# Patient Record
Sex: FEMALE | Race: WHITE | HISPANIC OR LATINO | Employment: FULL TIME | URBAN - METROPOLITAN AREA
[De-identification: names, ages, dates, MRNs, and addresses within clinical notes are randomized per-mention and may not be internally consistent; named-entity substitution may affect disease eponyms.]

---

## 2024-09-03 ENCOUNTER — HOSPITAL ENCOUNTER (OUTPATIENT)
Facility: HOSPITAL | Age: 34
Setting detail: OBSERVATION
Discharge: HOME | End: 2024-09-05
Attending: EMERGENCY MEDICINE | Admitting: STUDENT IN AN ORGANIZED HEALTH CARE EDUCATION/TRAINING PROGRAM
Payer: COMMERCIAL

## 2024-09-03 DIAGNOSIS — R42 VERTIGO: Primary | ICD-10-CM

## 2024-09-03 LAB
ANION GAP SERPL CALC-SCNC: 12 MMOL/L
APPEARANCE UR: CLEAR
BASOPHILS # BLD AUTO: 0.07 X10*3/UL (ref 0–0.1)
BASOPHILS NFR BLD AUTO: 0.3 %
BILIRUB UR STRIP.AUTO-MCNC: NEGATIVE MG/DL
BUN SERPL-MCNC: 13 MG/DL (ref 8–25)
CALCIUM SERPL-MCNC: 9.4 MG/DL (ref 8.5–10.4)
CHLORIDE SERPL-SCNC: 100 MMOL/L (ref 97–107)
CO2 SERPL-SCNC: 26 MMOL/L (ref 24–31)
COLOR UR: YELLOW
CREAT SERPL-MCNC: 0.9 MG/DL (ref 0.4–1.6)
EGFRCR SERPLBLD CKD-EPI 2021: 87 ML/MIN/1.73M*2
EOSINOPHIL # BLD AUTO: 0.12 X10*3/UL (ref 0–0.7)
EOSINOPHIL NFR BLD AUTO: 0.6 %
ERYTHROCYTE [DISTWIDTH] IN BLOOD BY AUTOMATED COUNT: 12.1 % (ref 11.5–14.5)
GLUCOSE SERPL-MCNC: 134 MG/DL (ref 65–99)
GLUCOSE UR STRIP.AUTO-MCNC: NORMAL MG/DL
HCG UR QL IA.RAPID: NEGATIVE
HCT VFR BLD AUTO: 38.6 % (ref 36–46)
HGB BLD-MCNC: 13.8 G/DL (ref 12–16)
IMM GRANULOCYTES # BLD AUTO: 0.04 X10*3/UL (ref 0–0.7)
IMM GRANULOCYTES NFR BLD AUTO: 0.2 % (ref 0–0.9)
KETONES UR STRIP.AUTO-MCNC: NEGATIVE MG/DL
LEUKOCYTE ESTERASE UR QL STRIP.AUTO: NEGATIVE
LYMPHOCYTES # BLD AUTO: 2.13 X10*3/UL (ref 1.2–4.8)
LYMPHOCYTES NFR BLD AUTO: 10.6 %
MCH RBC QN AUTO: 30.9 PG (ref 26–34)
MCHC RBC AUTO-ENTMCNC: 35.8 G/DL (ref 32–36)
MCV RBC AUTO: 86 FL (ref 80–100)
MONOCYTES # BLD AUTO: 1.04 X10*3/UL (ref 0.1–1)
MONOCYTES NFR BLD AUTO: 5.2 %
NEUTROPHILS # BLD AUTO: 16.62 X10*3/UL (ref 1.2–7.7)
NEUTROPHILS NFR BLD AUTO: 83.1 %
NITRITE UR QL STRIP.AUTO: NEGATIVE
NRBC BLD-RTO: ABNORMAL /100{WBCS}
PH UR STRIP.AUTO: 5.5 [PH]
PLATELET # BLD AUTO: 318 X10*3/UL (ref 150–450)
POTASSIUM SERPL-SCNC: 4.5 MMOL/L (ref 3.4–5.1)
PROT UR STRIP.AUTO-MCNC: NORMAL MG/DL
RBC # BLD AUTO: 4.47 X10*6/UL (ref 4–5.2)
RBC # UR STRIP.AUTO: NEGATIVE /UL
SODIUM SERPL-SCNC: 138 MMOL/L (ref 133–145)
SP GR UR STRIP.AUTO: 1.03
UROBILINOGEN UR STRIP.AUTO-MCNC: NORMAL MG/DL
WBC # BLD AUTO: 20 X10*3/UL (ref 4.4–11.3)

## 2024-09-03 PROCEDURE — 96361 HYDRATE IV INFUSION ADD-ON: CPT

## 2024-09-03 PROCEDURE — 81025 URINE PREGNANCY TEST: CPT | Performed by: EMERGENCY MEDICINE

## 2024-09-03 PROCEDURE — 80048 BASIC METABOLIC PNL TOTAL CA: CPT | Performed by: EMERGENCY MEDICINE

## 2024-09-03 PROCEDURE — 2500000004 HC RX 250 GENERAL PHARMACY W/ HCPCS (ALT 636 FOR OP/ED): Performed by: EMERGENCY MEDICINE

## 2024-09-03 PROCEDURE — 99285 EMERGENCY DEPT VISIT HI MDM: CPT | Mod: 25

## 2024-09-03 PROCEDURE — 96374 THER/PROPH/DIAG INJ IV PUSH: CPT

## 2024-09-03 PROCEDURE — 85025 COMPLETE CBC W/AUTO DIFF WBC: CPT | Performed by: EMERGENCY MEDICINE

## 2024-09-03 PROCEDURE — 87491 CHLMYD TRACH DNA AMP PROBE: CPT | Mod: TRILAB,WESLAB | Performed by: EMERGENCY MEDICINE

## 2024-09-03 PROCEDURE — 36415 COLL VENOUS BLD VENIPUNCTURE: CPT | Performed by: EMERGENCY MEDICINE

## 2024-09-03 PROCEDURE — 81001 URINALYSIS AUTO W/SCOPE: CPT | Performed by: EMERGENCY MEDICINE

## 2024-09-03 PROCEDURE — 96375 TX/PRO/DX INJ NEW DRUG ADDON: CPT

## 2024-09-03 PROCEDURE — 2500000001 HC RX 250 WO HCPCS SELF ADMINISTERED DRUGS (ALT 637 FOR MEDICARE OP): Performed by: EMERGENCY MEDICINE

## 2024-09-03 PROCEDURE — 86140 C-REACTIVE PROTEIN: CPT | Performed by: STUDENT IN AN ORGANIZED HEALTH CARE EDUCATION/TRAINING PROGRAM

## 2024-09-03 RX ORDER — DEXAMETHASONE SODIUM PHOSPHATE 10 MG/ML
10 INJECTION INTRAMUSCULAR; INTRAVENOUS ONCE
Status: COMPLETED | OUTPATIENT
Start: 2024-09-03 | End: 2024-09-03

## 2024-09-03 RX ORDER — PROCHLORPERAZINE EDISYLATE 5 MG/ML
10 INJECTION INTRAMUSCULAR; INTRAVENOUS ONCE
Status: COMPLETED | OUTPATIENT
Start: 2024-09-03 | End: 2024-09-03

## 2024-09-03 RX ORDER — MECLIZINE HYDROCHLORIDE 25 MG/1
25 TABLET ORAL ONCE
Status: COMPLETED | OUTPATIENT
Start: 2024-09-03 | End: 2024-09-03

## 2024-09-03 RX ORDER — ONDANSETRON HYDROCHLORIDE 2 MG/ML
4 INJECTION, SOLUTION INTRAVENOUS ONCE
Status: COMPLETED | OUTPATIENT
Start: 2024-09-03 | End: 2024-09-03

## 2024-09-03 ASSESSMENT — PAIN - FUNCTIONAL ASSESSMENT: PAIN_FUNCTIONAL_ASSESSMENT: 0-10

## 2024-09-03 ASSESSMENT — COLUMBIA-SUICIDE SEVERITY RATING SCALE - C-SSRS
2. HAVE YOU ACTUALLY HAD ANY THOUGHTS OF KILLING YOURSELF?: NO
6. HAVE YOU EVER DONE ANYTHING, STARTED TO DO ANYTHING, OR PREPARED TO DO ANYTHING TO END YOUR LIFE?: NO
1. IN THE PAST MONTH, HAVE YOU WISHED YOU WERE DEAD OR WISHED YOU COULD GO TO SLEEP AND NOT WAKE UP?: NO

## 2024-09-03 ASSESSMENT — PAIN SCALES - GENERAL: PAINLEVEL_OUTOF10: 0 - NO PAIN

## 2024-09-04 ENCOUNTER — APPOINTMENT (OUTPATIENT)
Dept: RADIOLOGY | Facility: HOSPITAL | Age: 34
End: 2024-09-04
Payer: COMMERCIAL

## 2024-09-04 PROBLEM — R42 VERTIGO: Status: ACTIVE | Noted: 2024-09-04

## 2024-09-04 PROBLEM — D72.829 LEUKOCYTOSIS: Status: ACTIVE | Noted: 2024-09-04

## 2024-09-04 LAB
ANION GAP SERPL CALC-SCNC: 12 MMOL/L
BUN SERPL-MCNC: 13 MG/DL (ref 8–25)
C TRACH RRNA SPEC QL NAA+PROBE: NEGATIVE
CALCIUM SERPL-MCNC: 9.2 MG/DL (ref 8.5–10.4)
CHLORIDE SERPL-SCNC: 104 MMOL/L (ref 97–107)
CO2 SERPL-SCNC: 20 MMOL/L (ref 24–31)
CREAT SERPL-MCNC: 0.7 MG/DL (ref 0.4–1.6)
CRP SERPL-MCNC: <0.3 MG/DL (ref 0–2)
EGFRCR SERPLBLD CKD-EPI 2021: >90 ML/MIN/1.73M*2
ERYTHROCYTE [DISTWIDTH] IN BLOOD BY AUTOMATED COUNT: 12.1 % (ref 11.5–14.5)
GLUCOSE SERPL-MCNC: 178 MG/DL (ref 65–99)
HCT VFR BLD AUTO: 39.3 % (ref 36–46)
HGB BLD-MCNC: 13.6 G/DL (ref 12–16)
MCH RBC QN AUTO: 30 PG (ref 26–34)
MCHC RBC AUTO-ENTMCNC: 34.6 G/DL (ref 32–36)
MCV RBC AUTO: 87 FL (ref 80–100)
MUCOUS THREADS #/AREA URNS AUTO: NORMAL /LPF
N GONORRHOEA DNA SPEC QL PROBE+SIG AMP: NEGATIVE
NRBC BLD-RTO: 0 /100 WBCS (ref 0–0)
PLATELET # BLD AUTO: 322 X10*3/UL (ref 150–450)
POTASSIUM SERPL-SCNC: 4.3 MMOL/L (ref 3.4–5.1)
RBC # BLD AUTO: 4.54 X10*6/UL (ref 4–5.2)
RBC #/AREA URNS AUTO: NORMAL /HPF
SODIUM SERPL-SCNC: 136 MMOL/L (ref 133–145)
SQUAMOUS #/AREA URNS AUTO: NORMAL /HPF
WBC # BLD AUTO: 16 X10*3/UL (ref 4.4–11.3)
WBC #/AREA URNS AUTO: NORMAL /HPF

## 2024-09-04 PROCEDURE — 96361 HYDRATE IV INFUSION ADD-ON: CPT

## 2024-09-04 PROCEDURE — 85027 COMPLETE CBC AUTOMATED: CPT | Performed by: STUDENT IN AN ORGANIZED HEALTH CARE EDUCATION/TRAINING PROGRAM

## 2024-09-04 PROCEDURE — G0425 INPT/ED TELECONSULT30: HCPCS | Performed by: STUDENT IN AN ORGANIZED HEALTH CARE EDUCATION/TRAINING PROGRAM

## 2024-09-04 PROCEDURE — 80048 BASIC METABOLIC PNL TOTAL CA: CPT | Performed by: STUDENT IN AN ORGANIZED HEALTH CARE EDUCATION/TRAINING PROGRAM

## 2024-09-04 PROCEDURE — 2500000001 HC RX 250 WO HCPCS SELF ADMINISTERED DRUGS (ALT 637 FOR MEDICARE OP): Performed by: STUDENT IN AN ORGANIZED HEALTH CARE EDUCATION/TRAINING PROGRAM

## 2024-09-04 PROCEDURE — 70553 MRI BRAIN STEM W/O & W/DYE: CPT

## 2024-09-04 PROCEDURE — 70450 CT HEAD/BRAIN W/O DYE: CPT | Performed by: RADIOLOGY

## 2024-09-04 PROCEDURE — 70450 CT HEAD/BRAIN W/O DYE: CPT

## 2024-09-04 PROCEDURE — G0378 HOSPITAL OBSERVATION PER HR: HCPCS

## 2024-09-04 PROCEDURE — 1100000001 HC PRIVATE ROOM DAILY

## 2024-09-04 PROCEDURE — 36415 COLL VENOUS BLD VENIPUNCTURE: CPT | Performed by: STUDENT IN AN ORGANIZED HEALTH CARE EDUCATION/TRAINING PROGRAM

## 2024-09-04 PROCEDURE — 87040 BLOOD CULTURE FOR BACTERIA: CPT | Mod: TRILAB | Performed by: STUDENT IN AN ORGANIZED HEALTH CARE EDUCATION/TRAINING PROGRAM

## 2024-09-04 PROCEDURE — 97165 OT EVAL LOW COMPLEX 30 MIN: CPT | Mod: GO

## 2024-09-04 PROCEDURE — 2500000004 HC RX 250 GENERAL PHARMACY W/ HCPCS (ALT 636 FOR OP/ED): Performed by: STUDENT IN AN ORGANIZED HEALTH CARE EDUCATION/TRAINING PROGRAM

## 2024-09-04 PROCEDURE — A9575 INJ GADOTERATE MEGLUMI 0.1ML: HCPCS | Performed by: STUDENT IN AN ORGANIZED HEALTH CARE EDUCATION/TRAINING PROGRAM

## 2024-09-04 PROCEDURE — 2550000001 HC RX 255 CONTRASTS: Performed by: STUDENT IN AN ORGANIZED HEALTH CARE EDUCATION/TRAINING PROGRAM

## 2024-09-04 PROCEDURE — 70553 MRI BRAIN STEM W/O & W/DYE: CPT | Performed by: STUDENT IN AN ORGANIZED HEALTH CARE EDUCATION/TRAINING PROGRAM

## 2024-09-04 PROCEDURE — 71045 X-RAY EXAM CHEST 1 VIEW: CPT | Performed by: RADIOLOGY

## 2024-09-04 PROCEDURE — 87075 CULTR BACTERIA EXCEPT BLOOD: CPT | Mod: TRILAB,59 | Performed by: STUDENT IN AN ORGANIZED HEALTH CARE EDUCATION/TRAINING PROGRAM

## 2024-09-04 PROCEDURE — 71045 X-RAY EXAM CHEST 1 VIEW: CPT

## 2024-09-04 RX ORDER — POLYETHYLENE GLYCOL 3350 17 G/17G
17 POWDER, FOR SOLUTION ORAL DAILY
Status: DISCONTINUED | OUTPATIENT
Start: 2024-09-04 | End: 2024-09-05 | Stop reason: HOSPADM

## 2024-09-04 RX ORDER — TALC
3 POWDER (GRAM) TOPICAL NIGHTLY PRN
Status: DISCONTINUED | OUTPATIENT
Start: 2024-09-04 | End: 2024-09-05 | Stop reason: HOSPADM

## 2024-09-04 RX ORDER — SODIUM CHLORIDE 9 MG/ML
100 INJECTION, SOLUTION INTRAVENOUS CONTINUOUS
Status: DISCONTINUED | OUTPATIENT
Start: 2024-09-04 | End: 2024-09-04

## 2024-09-04 RX ORDER — PROCHLORPERAZINE MALEATE 10 MG
10 TABLET ORAL EVERY 6 HOURS PRN
Status: DISCONTINUED | OUTPATIENT
Start: 2024-09-04 | End: 2024-09-05 | Stop reason: HOSPADM

## 2024-09-04 RX ORDER — ACETAMINOPHEN 160 MG/5ML
650 SOLUTION ORAL EVERY 4 HOURS PRN
Status: DISCONTINUED | OUTPATIENT
Start: 2024-09-04 | End: 2024-09-05 | Stop reason: HOSPADM

## 2024-09-04 RX ORDER — ONDANSETRON HYDROCHLORIDE 2 MG/ML
4 INJECTION, SOLUTION INTRAVENOUS EVERY 8 HOURS PRN
Status: DISCONTINUED | OUTPATIENT
Start: 2024-09-04 | End: 2024-09-05 | Stop reason: HOSPADM

## 2024-09-04 RX ORDER — MECLIZINE HYDROCHLORIDE 25 MG/1
25 TABLET ORAL 3 TIMES DAILY PRN
Status: DISCONTINUED | OUTPATIENT
Start: 2024-09-04 | End: 2024-09-05 | Stop reason: HOSPADM

## 2024-09-04 RX ORDER — FAMOTIDINE 20 MG/1
20 TABLET, FILM COATED ORAL 2 TIMES DAILY
Status: DISCONTINUED | OUTPATIENT
Start: 2024-09-04 | End: 2024-09-05 | Stop reason: HOSPADM

## 2024-09-04 RX ORDER — ACETAMINOPHEN 650 MG/1
650 SUPPOSITORY RECTAL EVERY 4 HOURS PRN
Status: DISCONTINUED | OUTPATIENT
Start: 2024-09-04 | End: 2024-09-05 | Stop reason: HOSPADM

## 2024-09-04 RX ORDER — MECLIZINE HYDROCHLORIDE 25 MG/1
25 TABLET ORAL 3 TIMES DAILY PRN
Qty: 10 TABLET | Refills: 0 | Status: SHIPPED | OUTPATIENT
Start: 2024-09-04 | End: 2024-09-05 | Stop reason: HOSPADM

## 2024-09-04 RX ORDER — GADOTERATE MEGLUMINE 376.9 MG/ML
20 INJECTION INTRAVENOUS
Status: COMPLETED | OUTPATIENT
Start: 2024-09-04 | End: 2024-09-04

## 2024-09-04 RX ORDER — ONDANSETRON 4 MG/1
4 TABLET, ORALLY DISINTEGRATING ORAL EVERY 8 HOURS PRN
Status: DISCONTINUED | OUTPATIENT
Start: 2024-09-04 | End: 2024-09-05 | Stop reason: HOSPADM

## 2024-09-04 RX ORDER — ENOXAPARIN SODIUM 100 MG/ML
40 INJECTION SUBCUTANEOUS NIGHTLY
Status: DISCONTINUED | OUTPATIENT
Start: 2024-09-04 | End: 2024-09-05 | Stop reason: HOSPADM

## 2024-09-04 RX ORDER — PROCHLORPERAZINE 25 MG/1
25 SUPPOSITORY RECTAL EVERY 12 HOURS PRN
Status: DISCONTINUED | OUTPATIENT
Start: 2024-09-04 | End: 2024-09-05 | Stop reason: HOSPADM

## 2024-09-04 RX ORDER — PROCHLORPERAZINE EDISYLATE 5 MG/ML
10 INJECTION INTRAMUSCULAR; INTRAVENOUS EVERY 6 HOURS PRN
Status: DISCONTINUED | OUTPATIENT
Start: 2024-09-04 | End: 2024-09-05 | Stop reason: HOSPADM

## 2024-09-04 RX ORDER — ONDANSETRON 4 MG/1
4 TABLET, ORALLY DISINTEGRATING ORAL EVERY 8 HOURS PRN
Qty: 20 TABLET | Refills: 0 | Status: SHIPPED | OUTPATIENT
Start: 2024-09-04 | End: 2024-09-05 | Stop reason: HOSPADM

## 2024-09-04 RX ORDER — ACETAMINOPHEN 325 MG/1
650 TABLET ORAL EVERY 4 HOURS PRN
Status: DISCONTINUED | OUTPATIENT
Start: 2024-09-04 | End: 2024-09-05 | Stop reason: HOSPADM

## 2024-09-04 RX ORDER — FAMOTIDINE 10 MG/ML
20 INJECTION INTRAVENOUS 2 TIMES DAILY
Status: DISCONTINUED | OUTPATIENT
Start: 2024-09-04 | End: 2024-09-05 | Stop reason: HOSPADM

## 2024-09-04 SDOH — SOCIAL STABILITY: SOCIAL INSECURITY: ABUSE: ADULT

## 2024-09-04 SDOH — SOCIAL STABILITY: SOCIAL INSECURITY: HAVE YOU HAD THOUGHTS OF HARMING ANYONE ELSE?: NO

## 2024-09-04 SDOH — SOCIAL STABILITY: SOCIAL INSECURITY: WERE YOU ABLE TO COMPLETE ALL THE BEHAVIORAL HEALTH SCREENINGS?: YES

## 2024-09-04 ASSESSMENT — PAIN SCALES - GENERAL
PAINLEVEL_OUTOF10: 1
PAINLEVEL_OUTOF10: 0 - NO PAIN
PAINLEVEL_OUTOF10: 1

## 2024-09-04 ASSESSMENT — LIFESTYLE VARIABLES
HOW OFTEN DO YOU HAVE 6 OR MORE DRINKS ON ONE OCCASION: NEVER
SKIP TO QUESTIONS 9-10: 1
AUDIT-C TOTAL SCORE: 1
HOW MANY STANDARD DRINKS CONTAINING ALCOHOL DO YOU HAVE ON A TYPICAL DAY: 1 OR 2
AUDIT-C TOTAL SCORE: 1
HOW OFTEN DO YOU HAVE A DRINK CONTAINING ALCOHOL: MONTHLY OR LESS

## 2024-09-04 ASSESSMENT — COGNITIVE AND FUNCTIONAL STATUS - GENERAL
DAILY ACTIVITIY SCORE: 24
DAILY ACTIVITIY SCORE: 24
MOBILITY SCORE: 24
DAILY ACTIVITIY SCORE: 24
PATIENT BASELINE BEDBOUND: NO
MOBILITY SCORE: 24

## 2024-09-04 ASSESSMENT — ENCOUNTER SYMPTOMS
DIARRHEA: 0
VOMITING: 1
FEVER: 0
DIZZINESS: 1
COUGH: 0
CHILLS: 1
ABDOMINAL PAIN: 0
SORE THROAT: 0
RHINORRHEA: 0
SHORTNESS OF BREATH: 0
HEADACHES: 0
JOINT SWELLING: 0
FACIAL ASYMMETRY: 0
LIGHT-HEADEDNESS: 1
SINUS PRESSURE: 0

## 2024-09-04 ASSESSMENT — ACTIVITIES OF DAILY LIVING (ADL)
ADL_ASSISTANCE: INDEPENDENT
WALKS IN HOME: INDEPENDENT
BATHING_ASSISTANCE: INDEPENDENT
HEARING - RIGHT EAR: FUNCTIONAL
JUDGMENT_ADEQUATE_SAFELY_COMPLETE_DAILY_ACTIVITIES: YES
BATHING: INDEPENDENT
GROOMING: INDEPENDENT
LACK_OF_TRANSPORTATION: NO
DRESSING YOURSELF: INDEPENDENT
HEARING - LEFT EAR: FUNCTIONAL
PATIENT'S MEMORY ADEQUATE TO SAFELY COMPLETE DAILY ACTIVITIES?: YES
FEEDING YOURSELF: INDEPENDENT
ADEQUATE_TO_COMPLETE_ADL: YES
TOILETING: INDEPENDENT

## 2024-09-04 ASSESSMENT — PATIENT HEALTH QUESTIONNAIRE - PHQ9
1. LITTLE INTEREST OR PLEASURE IN DOING THINGS: NOT AT ALL
2. FEELING DOWN, DEPRESSED OR HOPELESS: NOT AT ALL
SUM OF ALL RESPONSES TO PHQ9 QUESTIONS 1 & 2: 0

## 2024-09-04 ASSESSMENT — PAIN - FUNCTIONAL ASSESSMENT
PAIN_FUNCTIONAL_ASSESSMENT: 0-10

## 2024-09-04 ASSESSMENT — PAIN DESCRIPTION - LOCATION: LOCATION: GENERALIZED

## 2024-09-04 NOTE — CARE PLAN
Problem: Pain - Adult  Goal: Verbalizes/displays adequate comfort level or baseline comfort level  Outcome: Progressing     Problem: Safety - Adult  Goal: Free from fall injury  Outcome: Progressing     Problem: Discharge Planning  Goal: Discharge to home or other facility with appropriate resources  Outcome: Progressing     Problem: Chronic Conditions and Co-morbidities  Goal: Patient's chronic conditions and co-morbidity symptoms are monitored and maintained or improved  Outcome: Progressing   The patient's goals for the shift include Rest    The clinical goals for the shift include Monitor vitals, monitor dizziness, safety, promote rest      09/04/24 at 3:02 AM - Tamera French RN

## 2024-09-04 NOTE — PROGRESS NOTES
Evaluation    Patient Name: Gill Gomes  MRN: [id number]  Today's Date: 9/4/2024  Time Calculation  Start Time: 0804  Stop Time: 0822  Time Calculation (min): 18 min    Assessment  IP OT Assessment  OT Assessment: 33 yo female who presented with nausea, vomiting, and  inability to ambulate d/t vertigo presents at baseline functional status with ADLs due to improvement in symptoms following medication for same.  No furrther skilled OT services recommended at this time.  Will sign off.  Barriers to Discharge: Other (Comment) (fall risk)  Medical Staff Made Aware: Yes  End of Session Communication: Bedside nurse  End of Session Patient Position: Up in chair, Alarm off, not on at start of session    Plan:  No Skilled OT: No acute OT goals identified  OT Discharge Recommendations: No further acute OT  OT Recommended Transfer Status: Stand by assist  OT - OK to Discharge: Yes        Subjective   Current Problem:  1. Vertigo  ondansetron ODT (Zofran-ODT) 4 mg disintegrating tablet    meclizine (Antivert) 25 mg tablet        General:  General  Reason for Referral: Impaired ADLs r/t vertigo  Referred By: Dr Ruiz  Past Medical History Relevant to Rehab: anxiety, bipolar disorder per patient  Family/Caregiver Present: No  Prior to Session Communication: Bedside nurse  Patient Position Received: Bed, 2 rail up, Alarm off, not on at start of session  Preferred Learning Style: visual, written  General Comment: 33 yo female admitted with vertigo was cleared by nursing for therapy and agreeable to same.    Precautions:  Hearing/Visual Limitations: +corrective lenses  Medical Precautions: Fall precautions    Pain:  Pain Assessment  Pain Assessment: 0-10  0-10 (Numeric) Pain Score: 0 - No pain        Objective   Cognition:  Overall Cognitive Status: Within Functional Limits  Orientation Level: Oriented X4    Home Living:  Type of Home: House  Lives With: Alone  Home Adaptive Equipment: None  Home Layout: Multi-level, 1/2 bath on  main level, Bed/bath upstairs, Stairs to alternate level with rails  Alternate Level Stairs-Rails: Left  Alternate Level Stairs-Number of Steps: 10  Home Access: Stairs to enter with rails  Entrance Stairs-Rails: Both  Entrance Stairs-Number of Steps: 4-5  Bathroom Shower/Tub: Tub/shower unit  Bathroom Toilet: Standard  Bathroom Equipment: Built-in shower seat, Grab bars in shower  Home Living Comments: Pt is currently living in her father's home as he is now a nursing home resident.  Has a basement which she does not need to access.     Prior Function:  Level of [place]: Independent with ADLs and functional transfers, Independent with homemaking with ambulation  ADL Assistance: Independent  Homemaking Assistance: Independent  Ambulatory Assistance: Independent  Vocational: Full time employment ()    ADL:  Eating Assistance: Independent  Grooming Assistance: Independent  Bathing Assistance: Independent  UE Dressing Assistance: Independent  LE Dressing Assistance: Independent  LE Dressing Deficit:  (donning/dofffing socks seated edge of bed - remainder of ADLs = per clinical judgement)  Toileting Assistance with Device: Independent    Activity Tolerance:  Activity Tolerance Comments: Intermittent vertigo impedes activity tolerance but patient reports significant improvement since onset    Bed Mobility/Transfers: Bed Mobility  Bed Mobility: Yes  Bed Mobility 1  Bed Mobility 1: Supine to sitting  Level of Assistance 1: Modified independent  Bed Mobility Comments 1: logrolling to the right following    Transfers  Transfer: Yes  Transfer 1  Transfer From 1: Bed to  Transfer to 1: Stand  Technique 1: Sit to stand  Transfer Level of Assistance 1: Independent  Trials/Comments 1: instructed to allow for increased time between transitional movements  Transfers 2  Transfer From 2: Toilet to  Transfer to 2: Stand  Technique 2: Sit to stand, Stand to sit  Transfer Level of Assistance 2: Independent  Transfers  3  Transfer From 3: Stand to  Transfer to 3: Sit, Chair with arms  Technique 3: Stand to sit  Transfer Level of Assistance 3: Independent    Functional Mobility:  Functional Mobility  Functional Mobility Performed: Yes (Supervision in bedroom and bathroom with cues for pacing and body mechanics)    Sensation:  Light Touch: No apparent deficits    Strength:  Strength Comments: BUEs=~4+5 grossly    Hand Function:  Hand Function  Gross Grasp: Functional  Coordination: Functional    Extremities: RUE   RUE : Within Functional Limits and LUE   LUE: Within Functional Limits    Outcome Measures: [facility] Daily Activity  Putting on and taking off regular lower body clothing: None  Bathing (including washing, rinsing, drying): None  Putting on and taking off regular upper body clothing: None  Toileting, which includes using toilet, bedpan or urinal: None  Taking care of personal grooming such as brushing teeth: None  Eating Meals: None  Daily Activity - Total Score: 24

## 2024-09-04 NOTE — PROGRESS NOTES
Gill Gomes is a 34 y.o. female on day 0 of admission presenting with Vertigo.      Subjective   Dizziness has improved  Sounds peripheral in nature per her description worsening with head movement no abdominal pain no focal deficits  Per patient she discussed case with neurologist remotely who recommended MRI for her, order still pending       Objective     Last Recorded Vitals  /62 (BP Location: Right arm, Patient Position: Lying)   Pulse 65   Temp 36.9 °C (98.4 °F) (Temporal)   Resp 16   Wt 97.5 kg (215 lb)   SpO2 98%   Intake/Output last 3 Shifts:    Intake/Output Summary (Last 24 hours) at 9/4/2024 1437  Last data filed at 9/4/2024 1414  Gross per 24 hour   Intake 2443 ml   Output --   Net 2443 ml       Physical Exam  Sitting up in a chair on room air  Alert Misael x 3 cooperative  Chest clear  Heart regular  Abdomen soft nontender  Extremities no edema  Neurologic exam gross nonfocal there is no nystagmus  Relevant Results  Labs reviewed  Assessment/Plan     Principal Problem:    Vertigo  Active Problems:    Leukocytosis      September 4    Dizziness vertigo most likely peripheral.  Will order MRI brain to rule out central vertigo will await official neurology recommendation and will likely give outpatient referral to ENT.  She has had longstanding history of hearing loss             Tesfaye Kennedy MD

## 2024-09-04 NOTE — ED PROVIDER NOTES
HPI   Chief Complaint   Patient presents with    Dizziness     Pt was at work and was getting overheated. Pt felt flushed and dizzy. Pt took a break and felt worse. Pt left work and made it [place] through the parking lot before she had to sit due to dizziness. Pt vomited and called 911       HPI  34-year-old female complains of dizziness.  Patient states she was at work and she was wearing a short sleeve and a longsleeve shirt and it was very hot and she started to feel overheated and she went to the bathroom and she took off one of her cervix but she still felt dizzy so she went out to her car and she felt dizzy and she vomited.  She has a history of migraines but no headaches.  She does have some hearing issues and occasionally gets dizzy.  No chest pain or shortness of breath.  No diarrhea.  No fevers or chills.  Movement makes her symptoms much worse.  No other complaints.      Patient History   No past medical history on file.  No past surgical history on file.  No family history on file.  Social History     Tobacco Use    Smoking status: Not on file    Smokeless tobacco: Not on file   Substance Use Topics    Alcohol use: Not on file    Drug use: Not on file       Physical Exam   ED Triage Vitals [09/03/24 2052]   Temperature Heart Rate Respirations BP   34.7 °C (94.4 °F) 51 14 124/65      Pulse Ox Temp Source Heart Rate Source Patient Position   96 % Oral Monitor Sitting      BP Location FiO2 (%)     Right arm --       Physical Exam  General:  Awake, alert, no acute distress.  Head: Normocephalic, Atraumatic  Eyes: Pupils are equal and react to light and accommodation extraocular motors are intact no nystagmus.  Neck: Supple, trachea midline, no stridor  Skin: Warm and dry, no rashes   Lungs: Clear to auscultation bilaterally no acute respiratory distress, speaking in full sentences without difficulty  CV: Regular Rate Rhythm with no obvious murmurs gallops rubs noted, no jugular venous distention, no pedal  edema   Abdomen: Soft, nontender, nondistended, positive bowel sounds, no peritoneal signs  Neuro:  No gross focal neurologic deficits, NIH is 0  Musculoskeletal:  Full range of motion in all 4 extremities  Psychiatric:  Alert oriented x 3, Good insight into condition.    ED Course & MDM   Diagnoses as of 09/04/24 0048   Vertigo                 No data recorded     [place] Coma Scale Score: 15 (09/03/24 2139 : Jose De Paz, DON)                           Medical Decision Making  I treated the patient with IV fluids, Zofran, meclizine, Decadron, Compazine.  Patient symptoms improved however she is still very unsteady on her feet..  I still feel her symptoms are consistent with a peripheral vertigo.  She has a leukocytosis of 20,000 which I suspect is more likely reactionary to her vomiting and her condition.  Given the fact that she was still very unsteady on her feet I felt that she would benefit from admission for most likely vestibular rehab.  Discussed plan with the patient at bedside she was in agreement.  Contact the hospitalist for admission.    Procedure  Procedures     Layo Hitchcock DO  09/04/24 0100

## 2024-09-04 NOTE — H&P
History Of Present Illness  Gill Gomes is a 34 y.o. female presenting no significant past medical history presented to the ED with complaints of vertigo.  She reports episodes started sometime around 8 PM, felt like room was spinning lasting hours, which worsened when she would with head movements causing her to become nauseous.  Prior to coming to the ED she did have 5 minutes of dry heaving with vomitus, along with similar episode while in the ED.  She does complain of bilateral periodic tinnitus due to hearing loss but currently denies having tinnitus in the past couple days.  She denies any past history of vertigo, she did mention her mother suffers from vertigo and takes meclizine.  Patient denies any recent travel, tick bite.  She does complain of history of chronic cluster migraines which she has been to the hospital requiring treatments.  She also reports getting periodic ear infections but has not had any recent infections.  She denies fever, sore throat, cough, diarrhea, chest pain, shortness of breath or abdominal pain.    In the ED on admission vitals notable for pulse 51, RR 14, /65, SpO2 96.  Labs notable for glucose 134, WBC 20 with a left shift, UA negative for UTI, hCG negative.  Patient did receive 10 mg of dexamethasone, meclizine and prochlorperazine injection while in the ED along with 1 L normal saline.    Patient admitted for vertigo requiring vestibular physical therapy.     Past Medical History  No past medical history on file.    Surgical History  No past surgical history on file.     Social History  She has no history on file for tobacco use, alcohol use, and drug use.    Family History  No family history on file.     Allergies  Patient has no known allergies.    Review of Systems   Constitutional:  Positive for chills. Negative for fever.   HENT:  Positive for hearing loss. Negative for ear pain, rhinorrhea, sinus pressure and sore throat.    Respiratory:  Negative for cough  "and shortness of breath.    Cardiovascular:  Negative for chest pain.   Gastrointestinal:  Positive for vomiting. Negative for abdominal pain and diarrhea.   Musculoskeletal:  Negative for joint swelling.   Neurological:  Positive for dizziness and light-headedness. Negative for facial asymmetry and headaches.        Physical Exam  Constitutional:       General: She is not in acute distress.     Appearance: She is not ill-appearing or toxic-appearing.   HENT:      Head: Normocephalic and atraumatic.      Mouth/Throat:      Mouth: Mucous membranes are moist.   Eyes:      Conjunctiva/sclera: Conjunctivae normal.   Cardiovascular:      Heart sounds: No murmur heard.     No gallop.   Pulmonary:      Effort: No respiratory distress.      Breath sounds: No wheezing or rales.   Abdominal:      General: Bowel sounds are normal. There is no distension.      Tenderness: There is no abdominal tenderness. There is no guarding.   Musculoskeletal:         General: No deformity.      Cervical back: Normal range of motion and neck supple.      Right lower leg: No edema.      Left lower leg: No edema.   Neurological:      General: No focal deficit present.      Mental Status: She is oriented to person, place, and time.   Psychiatric:         Mood and Affect: Mood normal.         Behavior: Behavior normal.          Last Recorded Vitals  Blood pressure 139/82, pulse 68, temperature 34.7 °C (94.4 °F), temperature source Oral, resp. rate 16, height 1.651 m (5' 5\"), weight 97.5 kg (215 lb), SpO2 98%.    Relevant Results  Scheduled medications  enoxaparin, 40 mg, subcutaneous, Nightly  famotidine, 20 mg, oral, BID   Or  famotidine, 20 mg, intravenous, BID  polyethylene glycol, 17 g, oral, Daily      Continuous medications  sodium chloride 0.9%, 100 mL/hr, Last Rate: 100 mL/hr (09/04/24 0158)      PRN medications  PRN medications: meclizine, melatonin, ondansetron ODT **OR** ondansetron, prochlorperazine **OR** prochlorperazine **OR** " prochlorperazine  Results for orders placed or performed during the hospital encounter of 09/03/24 (from the past 24 hour(s))   CBC and Auto Differential   Result Value Ref Range    WBC 20.0 (H) 4.4 - 11.3 x10*3/uL    nRBC      RBC 4.47 4.00 - 5.20 x10*6/uL    Hemoglobin 13.8 12.0 - 16.0 g/dL    Hematocrit 38.6 36.0 - 46.0 %    MCV 86 80 - 100 fL    MCH 30.9 26.0 - 34.0 pg    MCHC 35.8 32.0 - 36.0 g/dL    RDW 12.1 11.5 - 14.5 %    Platelets 318 150 - 450 x10*3/uL    Neutrophils % 83.1 40.0 - 80.0 %    Immature Granulocytes %, Automated 0.2 0.0 - 0.9 %    Lymphocytes % 10.6 13.0 - 44.0 %    Monocytes % 5.2 2.0 - 10.0 %    Eosinophils % 0.6 0.0 - 6.0 %    Basophils % 0.3 0.0 - 2.0 %    Neutrophils Absolute 16.62 (H) 1.20 - 7.70 x10*3/uL    Immature Granulocytes Absolute, Automated 0.04 0.00 - 0.70 x10*3/uL    Lymphocytes Absolute 2.13 1.20 - 4.80 x10*3/uL    Monocytes Absolute 1.04 (H) 0.10 - 1.00 x10*3/uL    Eosinophils Absolute 0.12 0.00 - 0.70 x10*3/uL    Basophils Absolute 0.07 0.00 - 0.10 x10*3/uL   Basic metabolic panel   Result Value Ref Range    Glucose 134 (H) 65 - 99 mg/dL    Sodium 138 133 - 145 mmol/L    Potassium 4.5 3.4 - 5.1 mmol/L    Chloride 100 97 - 107 mmol/L    Bicarbonate 26 24 - 31 mmol/L    Urea Nitrogen 13 8 - 25 mg/dL    Creatinine 0.90 0.40 - 1.60 mg/dL    eGFR 87 >60 mL/min/1.73m*2    Calcium 9.4 8.5 - 10.4 mg/dL    Anion Gap 12 <=19 mmol/L   hCG, Urine, Qualitative   Result Value Ref Range    HCG, Urine NEGATIVE NEGATIVE   Urinalysis with Reflex Culture and Microscopic   Result Value Ref Range    Color, Urine Yellow Light-Yellow, Yellow, Dark-Yellow    Appearance, Urine Clear Clear    Specific Gravity, Urine 1.030 1.005 - 1.035    pH, Urine 5.5 5.0, 5.5, 6.0, 6.5, 7.0, 7.5, 8.0    Protein, Urine 20 (TRACE) NEGATIVE, 10 (TRACE), 20 (TRACE) mg/dL    Glucose, Urine Normal Normal mg/dL    Blood, Urine NEGATIVE NEGATIVE    Ketones, Urine NEGATIVE NEGATIVE mg/dL    Bilirubin, Urine NEGATIVE  NEGATIVE    Urobilinogen, Urine Normal Normal mg/dL    Nitrite, Urine NEGATIVE NEGATIVE    Leukocyte Esterase, Urine NEGATIVE NEGATIVE   Urinalysis Microscopic   Result Value Ref Range    WBC, Urine 1-5 1-5, NONE /HPF    RBC, Urine 1-2 NONE, 1-2, 3-5 /HPF    Squamous Epithelial Cells, Urine 1-9 (SPARSE) Reference range not established. /HPF    Mucus, Urine 1+ Reference range not established. /LPF          Assessment/Plan   Assessment & Plan  Vertigo  Single episode of vertigo lasting several hours, history of migraines, hearing loss and tinnitus, along with elevated WBC with left shift would be suspicious for vestibular neuritis versus Ménière's disease.  Although an MRI will be more appropriate, I will order a stat CT of head to rule out vestibular TIA/brainstem or cerebral infarction or hemorrhage.  Status post Decadron 10 mg injection in ED  Continue meclizine, as needed Zofran, and prochlorperazine  Physical therapy consult for vestibular PT  OT consulted  Deferring MRI to neurology  Neurology consulted  Leukocytosis  WBC 20 with left shift, UA negative for infection,  Chest x-ray pending  Blood cultures pending    GI prophylaxis: Famotidine  DVT prophylaxis: Enoxaparin    CODE STATUS full code    I spent 75 minutes in the professional and overall care of this patient.      Nas Ruiz MD

## 2024-09-04 NOTE — CONSULTS
"Tele-Neurology Consult    Referred by: No ref. provider found, PCP: No Assigned PCP Generic Provider, MD    The patient was informed about the telehealth clinical encounter. Telehealth sessions are not being recorded and personal health information is protected. All questions were answered and verbal consent from the patient (or guardian) was obtained to proceed.    History Of Present Illness:  Ms. Gomes is a 34 y.o.  female admitted 9/3/2024 LOS day 0, consulted for vertigo.    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 Mins later had to sit down, felt drunk, vearing to the right. Vomitting for severe minutes.  She did have a mild headache. This faded off during the night, but us still there somewhat.     Focus poor.   Hearing significantly impaired in both ears. ([employer] while swimming and chronic ear in left ear)     Spinning to right.     Migraines     Day before felt tired and fatigued.   No tinnitus   Some fullness feeling     \"ED on admission vitals notable for pulse 51, RR 14, /65, SpO2 96. Labs notable for glucose 134, WBC 20 with a left shift, UA negative for UTI, hCG negative. Patient did receive 10 mg of dexamethasone, meclizine and prochlorperazine injection while in the ED along with 1 L normal saline.\"    I personally reviewed her CT head which showed some evidence of left mastoid effusion.  There were no intracranial abnormalities.      Past Medical History  Past Medical History:   Diagnosis Date    Migraines    [place] for Bipolar II.   Surgical History  No past surgical history on file.  Social History  Social History     Tobacco Use    Smoking status: Never     Passive exposure: Never    Smokeless tobacco: Never   Vaping Use    Vaping status: Never Used     Meds and Allergies  Reviewed in EMR    Objective   Blood pressure 103/62, pulse 65, temperature 36.9 °C (98.4 °F), temperature source Temporal, resp. rate 16, height " "1.651 m (5' 5\"), weight 97.5 kg (215 lb), SpO2 98%.    Virtual Neurological Exam:   General appearance - in no acute distress.   Mental status: alert, interactive and cooperative with an appropriate affect.    Speech is clear.    Language intact for comprehension, expression, and vocabulary.    Orientation to self, time and place intact. Recall- 3/3 objects.  Cranial nerves: No ptosis. Facial symmetry and movements are normal.    Ocular movements full without complaints of diplopia or observed nystagmus. Self-reported visual fields are full.    Motor: no arm drift, hand dexterity is normal. No abnormal or adventitious motor movements were noted.   Coordination of UE is normal. Sensation: light touch is reported to be intact.    Able to stand independently with stable stance.  Gait straightaway is normal without observed spasticity or ataxia.    Head impulse test was normal    Reviewed relevant results, independent review of imaging:   CT head wo IV contrast    Result Date: 9/4/2024  Interpreted By:  Finkelstein, Evan, STUDY: CT HEAD WO IV CONTRAST;  9/4/2024 3:26 am   INDICATION: Signs/Symptoms:Vertigo, rule out CVA.     COMPARISON: None.   ACCESSION NUMBER(S): [id number]   ORDERING CLINICIAN: CALLIE SOTOMAYOR   TECHNIQUE: Axial noncontrast CT images of the head with coronal and sagittal reconstructions.   FINDINGS: EXTRACRANIAL SOFT TISSUES: Unremarkable.   CALVARIUM: No depressed skull fracture. No destructive osseous lesion.   PARANASAL SINUSES/MASTOIDS: The visualized paranasal sinuses are aerated. Fluid-filled left mastoid air cells.   HEMORRHAGE: No acute intracranial hemorrhage.   BRAIN PARENCHYMA: Gray-white matter interfaces are preserved. No mass effect or midline shift.   VENTRICLES and EXTRA-AXIAL SPACES: Normal size.   OTHER FINDINGS: None.       No acute intracranial hemorrhage, mass effect or midline shift.  If there is persistent clinical concern for ischemia, recommend further evaluation with MRI.   " "Fluid-filled left mastoid air cells. Correlate for symptoms of mastoiditis.   MACRO: None.   Signed by: Evan Finkelstein 9/4/2024 3:57 AM Dictation workstation:   [id number]   No MRI head results found for the past 14 days  No results found for this or any previous visit (from the past 4464 hour(s)).   No echocardiogram results found for the past 14 days        No results found for: \"BNP\"     Assessment:   Ms. Gomes is a 34 y.o. female presenting with acute onset minutes to hour long episode of acute vertigo.  She does have history of migraines however did not have a typical migraine at the time of this attack.  Her examination within the limitations of a virtual encounter is normal.  There are no peripheral localizing signs for her vestibulopathy.    At this time the differential diagnosis includes a central demyelinating or ischemic event, a rapidly improving peripheral vestibular disorder however unclear how would respond this quickly.  Or an unusual presentation of vestibular migraine with minimal headache feature.      Recommendations:  She will need an MRI brain with and without contrast  Pending these results we may consider outpatient ENT eval  She will likely need vestibular rehab regardless of the underlying diagnosis       Consult completed by: TELEPHONE and VIDEO interactive communication was used to provide this telehealth service. Time includes consultation with ED provider and extensive review of data- history, medical records, lab/radiology/medical test results, neuroimaging studies:  30 minutes was spent in INITIAL telehealth consultation    Rustam Piña MD  Neurology and Neuromuscular Medicine   [facility]  [facility] [place]       "

## 2024-09-04 NOTE — ASSESSMENT & PLAN NOTE
Single episode of vertigo lasting several hours, history of migraines, hearing loss and tinnitus, along with elevated WBC with left shift would be suspicious for vestibular neuritis versus Ménière's disease.  Although an MRI will be more appropriate, I will order a stat CT of head to rule out vestibular TIA/brainstem or cerebral infarction or hemorrhage.  Status post Decadron 10 mg injection in ED  Continue meclizine, as needed Zofran, and prochlorperazine  Physical therapy consult for vestibular PT  OT consulted  Deferring MRI to neurology  Neurology consulted

## 2024-09-04 NOTE — PROGRESS NOTES
09/04/24 1303   Discharge Planning   Expected Discharge Disposition Home   Does the patient need discharge transport arranged? No     PT and OT signed off service for patient. No Care Transitions consult at this time. Please contact Care Transitions if needs arise.

## 2024-09-04 NOTE — PROGRESS NOTES
"Physical Therapy                 Therapy Communication Note - PT screen    Patient Name: Gill Gomes  MRN: [id number]  Today's Date: 9/4/2024     Discipline: Physical Therapy    Missed Visit Reason: Missed Visit Reason: Other (Comment) (PT screen)    Comment: 33 y/o female who presented to ED with vertigo symptoms. Pt sitting up in chair upon PT arrival. Discussed mobility status and concerns. Pt verbalized, \"If I wasn't dizzy, I'd be fine.\" Pt reportedly up to/from bathroom without concerns. \"The other therapists told me to move slowly and watch my head turns.\" Reinforced moving slowly and affects of quick head position changes. Discussed community resources available (vestibular PT) pending medical diagnosis. Pt denying any physical needs/concerns, and agreeable to no further PT needs while in-house.   "

## 2024-09-05 ENCOUNTER — PHARMACY VISIT (OUTPATIENT)
Dept: PHARMACY | Facility: CLINIC | Age: 34
End: 2024-09-05
Payer: MEDICAID

## 2024-09-05 VITALS
TEMPERATURE: 97.7 F | BODY MASS INDEX: 35.82 KG/M2 | RESPIRATION RATE: 18 BRPM | OXYGEN SATURATION: 100 % | SYSTOLIC BLOOD PRESSURE: 114 MMHG | DIASTOLIC BLOOD PRESSURE: 65 MMHG | HEIGHT: 65 IN | WEIGHT: 215 LBS | HEART RATE: 67 BPM

## 2024-09-05 LAB
ANION GAP SERPL CALC-SCNC: 9 MMOL/L
BUN SERPL-MCNC: 12 MG/DL (ref 8–25)
CALCIUM SERPL-MCNC: 8.9 MG/DL (ref 8.5–10.4)
CHLORIDE SERPL-SCNC: 108 MMOL/L (ref 97–107)
CO2 SERPL-SCNC: 23 MMOL/L (ref 24–31)
CREAT SERPL-MCNC: 0.7 MG/DL (ref 0.4–1.6)
EGFRCR SERPLBLD CKD-EPI 2021: >90 ML/MIN/1.73M*2
GLUCOSE SERPL-MCNC: 129 MG/DL (ref 65–99)
POTASSIUM SERPL-SCNC: 3.9 MMOL/L (ref 3.4–5.1)
SODIUM SERPL-SCNC: 140 MMOL/L (ref 133–145)

## 2024-09-05 PROCEDURE — RXMED WILLOW AMBULATORY MEDICATION CHARGE

## 2024-09-05 PROCEDURE — G0378 HOSPITAL OBSERVATION PER HR: HCPCS

## 2024-09-05 PROCEDURE — 2500000001 HC RX 250 WO HCPCS SELF ADMINISTERED DRUGS (ALT 637 FOR MEDICARE OP): Performed by: STUDENT IN AN ORGANIZED HEALTH CARE EDUCATION/TRAINING PROGRAM

## 2024-09-05 PROCEDURE — G0406 INPT/TELE FOLLOW UP 15: HCPCS | Performed by: STUDENT IN AN ORGANIZED HEALTH CARE EDUCATION/TRAINING PROGRAM

## 2024-09-05 PROCEDURE — 84520 ASSAY OF UREA NITROGEN: CPT | Performed by: STUDENT IN AN ORGANIZED HEALTH CARE EDUCATION/TRAINING PROGRAM

## 2024-09-05 PROCEDURE — 36415 COLL VENOUS BLD VENIPUNCTURE: CPT | Performed by: STUDENT IN AN ORGANIZED HEALTH CARE EDUCATION/TRAINING PROGRAM

## 2024-09-05 RX ORDER — MECLIZINE HYDROCHLORIDE 25 MG/1
25 TABLET ORAL 3 TIMES DAILY PRN
Qty: 30 TABLET | Refills: 0 | Status: SHIPPED | OUTPATIENT
Start: 2024-09-05 | End: 2024-10-05

## 2024-09-05 RX ORDER — ONDANSETRON 4 MG/1
4 TABLET, ORALLY DISINTEGRATING ORAL EVERY 8 HOURS PRN
Qty: 20 TABLET | Refills: 0 | Status: SHIPPED | OUTPATIENT
Start: 2024-09-05 | End: 2024-10-05

## 2024-09-05 SDOH — ECONOMIC STABILITY: FOOD INSECURITY: WITHIN THE PAST 12 MONTHS, THE FOOD YOU BOUGHT JUST DIDN'T LAST AND YOU DIDN'T HAVE MONEY TO GET MORE.: NEVER TRUE

## 2024-09-05 SDOH — ECONOMIC STABILITY: FOOD INSECURITY: WITHIN THE PAST 12 MONTHS, YOU WORRIED THAT YOUR FOOD WOULD RUN OUT BEFORE YOU GOT MONEY TO BUY MORE.: NEVER TRUE

## 2024-09-05 SDOH — ECONOMIC STABILITY: HOUSING INSECURITY: AT ANY TIME IN THE PAST 12 MONTHS, WERE YOU HOMELESS OR LIVING IN A SHELTER (INCLUDING NOW)?: NO

## 2024-09-05 SDOH — ECONOMIC STABILITY: HOUSING INSECURITY: IN THE PAST 12 MONTHS, HOW MANY TIMES HAVE YOU MOVED WHERE YOU WERE LIVING?: 1

## 2024-09-05 SDOH — ECONOMIC STABILITY: TRANSPORTATION INSECURITY
IN THE PAST 12 MONTHS, HAS THE LACK OF TRANSPORTATION KEPT YOU FROM MEDICAL APPOINTMENTS OR FROM GETTING MEDICATIONS?: NO

## 2024-09-05 SDOH — SOCIAL STABILITY: SOCIAL INSECURITY
WITHIN THE LAST YEAR, HAVE TO BEEN RAPED OR FORCED TO HAVE ANY KIND OF SEXUAL ACTIVITY BY YOUR PARTNER OR EX-PARTNER?: NO

## 2024-09-05 SDOH — SOCIAL STABILITY: SOCIAL INSECURITY
WITHIN THE LAST YEAR, HAVE YOU BEEN KICKED, HIT, SLAPPED, OR OTHERWISE PHYSICALLY HURT BY YOUR PARTNER OR EX-PARTNER?: NO

## 2024-09-05 SDOH — ECONOMIC STABILITY: TRANSPORTATION INSECURITY
IN THE PAST 12 MONTHS, HAS LACK OF TRANSPORTATION KEPT YOU FROM MEETINGS, WORK, OR FROM GETTING THINGS NEEDED FOR DAILY LIVING?: NO

## 2024-09-05 SDOH — SOCIAL STABILITY: SOCIAL INSECURITY: WITHIN THE LAST YEAR, HAVE YOU BEEN AFRAID OF YOUR PARTNER OR EX-PARTNER?: NO

## 2024-09-05 SDOH — SOCIAL STABILITY: SOCIAL INSECURITY: WITHIN THE LAST YEAR, HAVE YOU BEEN HUMILIATED OR EMOTIONALLY ABUSED IN OTHER WAYS BY YOUR PARTNER OR EX-PARTNER?: NO

## 2024-09-05 SDOH — HEALTH STABILITY: MENTAL HEALTH
HOW OFTEN DO YOU NEED TO HAVE SOMEONE HELP YOU WHEN YOU READ INSTRUCTIONS, PAMPHLETS, OR OTHER WRITTEN MATERIAL FROM YOUR DOCTOR OR PHARMACY?: NEVER

## 2024-09-05 SDOH — ECONOMIC STABILITY: INCOME INSECURITY: IN THE PAST 12 MONTHS, HAS THE ELECTRIC, GAS, OIL, OR WATER COMPANY THREATENED TO SHUT OFF SERVICE IN YOUR HOME?: NO

## 2024-09-05 SDOH — ECONOMIC STABILITY: INCOME INSECURITY: HOW HARD IS IT FOR YOU TO PAY FOR THE VERY BASICS LIKE FOOD, HOUSING, MEDICAL CARE, AND HEATING?: NOT HARD AT ALL

## 2024-09-05 SDOH — ECONOMIC STABILITY: INCOME INSECURITY: IN THE LAST 12 MONTHS, WAS THERE A TIME WHEN YOU WERE NOT ABLE TO PAY THE MORTGAGE OR RENT ON TIME?: NO

## 2024-09-05 ASSESSMENT — COGNITIVE AND FUNCTIONAL STATUS - GENERAL
MOBILITY SCORE: 24
DAILY ACTIVITIY SCORE: 24

## 2024-09-05 ASSESSMENT — PAIN SCALES - GENERAL
PAINLEVEL_OUTOF10: 0 - NO PAIN
PAINLEVEL_OUTOF10: 0 - NO PAIN
PAINLEVEL_OUTOF10: 1

## 2024-09-05 ASSESSMENT — ACTIVITIES OF DAILY LIVING (ADL): LACK_OF_TRANSPORTATION: NO

## 2024-09-05 ASSESSMENT — PAIN SCALES - PAIN ASSESSMENT IN ADVANCED DEMENTIA (PAINAD): TOTALSCORE: MEDICATION (SEE MAR)

## 2024-09-05 ASSESSMENT — PAIN DESCRIPTION - LOCATION: LOCATION: GENERALIZED

## 2024-09-05 NOTE — DISCHARGE SUMMARY
Discharge Diagnosis  Patient Active Problem List   Diagnosis    Vertigo    Leukocytosis         Issues Requiring Follow-Up  Per instructions    Discharge Meds     Your medication list        START taking these medications        Instructions Last Dose Given Next Dose Due   meclizine 25 mg tablet  Commonly known as: Antivert      Take 1 tablet (25 mg) by mouth 3 times a day as needed for dizziness.       ondansetron ODT 4 mg disintegrating tablet  Commonly known as: Zofran-ODT      Dissolve 1 tablet (4 mg) by mouth every 8 hours if needed for nausea or vomiting.                 Where to Get Your Medications        These medications were sent to [facility]  [address], [address], [facility] [address]      Hours: 9 AM to 6 PM Mon-Fri, 9 AM to 1 PM Sat Phone: [phone]   meclizine 25 mg tablet  ondansetron ODT 4 mg disintegrating tablet         Test Results Pending At Discharge  Pending Labs       Order Current Status    Extra Urine Gray Tube Collected (09/03/24 7106)    Urinalysis with Reflex Culture and Microscopic In process    Blood Culture Preliminary result    Blood Culture Preliminary result            Hospital Course  This patient presented with acute onset of vertigo which was clearly peripheral clinically.  Symptoms abated with the help of meclizine and Zofran.  She is completely asymptomatic now.  She has longstanding history of ear problems MRI demonstrated chronic mastoid effusion.  I discussed this findings with Dr. Lagunas from ENT who felt that this is unlikely to represent infection in case she has currently no symptoms but he will follow-up with her promptly as an outpatient.  MRI of the brain also question Chiari I malformation and this is currently being reviewed by Dr. Piña from neurology who will discuss findings further with the patient and plan on outpatient follow-up    Pertinent Physical Exam At Time of Discharge  Patient was sound asleep when I came sera woke up fully  coherent no distress became anxious after I discussed MRI findings of possible Chiari I malformation.  She states that all of her dizziness and vomiting is completely resolved at the moment.  There is no ear pain or headache whatsoever  Normocephalic/atraumatic EOMI PERRLA  Neck supple no JVD  Chest clear  Heart regular  Abdomen soft nontender  Extremities no edema  Neurologic exam focal    Outpatient Follow-Up  Per chart    Time spent on discharge arrangement:  45 minutes    Tesfaye Kennedy MD

## 2024-09-05 NOTE — DISCHARGE INSTRUCTIONS
Be careful in case of recurrent dizziness not to fall  There is a request for physical therapy outpatient if you decide to do it  Follow-up with ENT and neurology

## 2024-09-05 NOTE — PROGRESS NOTES
09/05/24 1102   Discharge Planning   Living Arrangements Alone   Support Systems Parent   Assistance Needed Patient reports she is independent of all ADLs and IADLs.   Type of Residence Private residence   Who is requesting discharge planning? Provider   Home or Post Acute Services None   Expected Discharge Disposition Home   Does the patient need discharge transport arranged? Yes   Financial Resource Strain   How hard is it for you to pay for the very basics like food, housing, medical care, and heating? Not hard   Housing Stability   In the last 12 months, was there a time when you were not able to pay the mortgage or rent on time? N   In the past 12 months, how many times have you moved where you were living? 1   At any time in the past 12 months, were you homeless or living in a shelter (including now)? N   Transportation Needs   In the past 12 months, has lack of transportation kept you from medical appointments or from getting medications? no   In the past 12 months, has lack of transportation kept you from meetings, work, or from getting things needed for daily living? No     MSW met with patient at bedside. She reports she is hoping to go home today. Patient is requesting outpatient vestibular therapy upon discharge; Dr. Kennedy made aware. Patient denies any barriers with obtaining food, medications, transportation or paying bills.     Patient will return home upon discharge. Patient will need shuttle arranged to transport her to her workplace as he car is there. Nurse aware.

## 2024-09-05 NOTE — CARE PLAN
The patient's goals for the shift include Rest    The clinical goals for the shift include oob with meals      09/05/24 at 12:36 AM - DARINEL CABELLO RN

## 2024-09-05 NOTE — PROGRESS NOTES
09/05/24 0901   Discharge Planning   Expected Discharge Disposition Home   Does the patient need discharge transport arranged? No

## 2024-09-05 NOTE — PROGRESS NOTES
"The patient was informed about the telehealth clinical encounter. Telehealth sessions are not being recorded and personal health information is protected. All questions were answered and verbal consent from the patient (or guardian) was obtained to proceed.    Consult completed by: TELEPHONE and VIDEO interactive communication was used to provide this telehealth service. Time includes consultation with ED provider and extensive review of data- history, medical records, lab/radiology/medical test results, neuroimaging studies:  15 minutes was spent in FOLLOW-UP telehealth consultation       Subjective:    Condition is unchanged.  MRI is complete.      Objective  Blood pressure 103/62, pulse 65, temperature 36.9 °C (98.4 °F), temperature source Temporal, resp. rate 16, height 1.651 m (5' 5\"), weight 97.5 kg (215 lb), SpO2 98%.     Virtual Neurological Exam:   General appearance - in no acute distress.   Mental status: alert, interactive and cooperative with an appropriate affect.    Speech is clear.    Language intact for comprehension, expression, and vocabulary.    Orientation to self, time and place intact. Recall- 3/3 objects.  Cranial nerves: No ptosis. Facial symmetry and movements are normal.    Ocular movements full without complaints of diplopia or observed nystagmus. Self-reported visual fields are full.    Motor: no arm drift, hand dexterity is normal. No abnormal or adventitious motor movements were noted.   Coordination of UE is normal. Sensation: light touch is reported to be intact.    Able to stand independently with stable stance.  Gait straightaway is normal without observed spasticity or ataxia.     Head impulse test was normal      Assessment:   Ms. Gomes is a 34 y.o. female presenting with acute onset minutes to hour long episode of acute vertigo.  She does have history of migraines however did not have a typical migraine at the time of this attack.  Her examination within the limitations of a " "virtual encounter is normal.  There are no peripheral localizing signs for her vestibulopathy.     Her MRI was completed which showed a Chiari I malformation which is very mild, and may or may not be the cause of her issues.  Importantly there is no evidence of a demyelinating lesion.  I discussed in detail with radiology a mild asymmetric FLAIR hyperintensity within the left periventricular white matter, head of caudate and corpus callosum, per radiologist -- possible explanation is \"early microvascular disease related changes as the periventricular white is supplied by ventriculofugal vessels arising from subependymal arteries, which tend to be more strongly influenced by hemodynamic factors\"    Again these findings are of unclear significance and I will discuss this with some colleagues.        Recommendations:  Vestibular rehab  ENT follow-up  Neurology follow-up with me for her Chiari malformation, and MRI findings       Rustam Piña MD    "

## 2024-09-06 ENCOUNTER — TELEPHONE (OUTPATIENT)
Dept: INPATIENT UNIT | Facility: HOSPITAL | Age: 34
End: 2024-09-06
Payer: COMMERCIAL

## 2024-09-08 LAB
BACTERIA BLD CULT: NORMAL
BACTERIA BLD CULT: NORMAL

## 2024-09-09 ENCOUNTER — OFFICE VISIT (OUTPATIENT)
Dept: NEUROLOGY | Facility: CLINIC | Age: 34
End: 2024-09-09
Payer: COMMERCIAL

## 2024-09-09 VITALS
BODY MASS INDEX: 35.68 KG/M2 | WEIGHT: 222 LBS | SYSTOLIC BLOOD PRESSURE: 102 MMHG | RESPIRATION RATE: 18 BRPM | HEART RATE: 85 BPM | HEIGHT: 66 IN | DIASTOLIC BLOOD PRESSURE: 74 MMHG | TEMPERATURE: 98 F

## 2024-09-09 DIAGNOSIS — R42 VERTIGO: ICD-10-CM

## 2024-09-09 DIAGNOSIS — G43.009 MIGRAINE WITHOUT AURA AND WITHOUT STATUS MIGRAINOSUS, NOT INTRACTABLE: Primary | ICD-10-CM

## 2024-09-09 DIAGNOSIS — G93.5 CHIARI I MALFORMATION (MULTI): ICD-10-CM

## 2024-09-09 PROCEDURE — 99205 OFFICE O/P NEW HI 60 MIN: CPT | Performed by: STUDENT IN AN ORGANIZED HEALTH CARE EDUCATION/TRAINING PROGRAM

## 2024-09-09 PROCEDURE — 1036F TOBACCO NON-USER: CPT | Performed by: STUDENT IN AN ORGANIZED HEALTH CARE EDUCATION/TRAINING PROGRAM

## 2024-09-09 PROCEDURE — 3008F BODY MASS INDEX DOCD: CPT | Performed by: STUDENT IN AN ORGANIZED HEALTH CARE EDUCATION/TRAINING PROGRAM

## 2024-09-09 RX ORDER — LURASIDONE HYDROCHLORIDE 40 MG/1
40 TABLET, FILM COATED ORAL NIGHTLY
COMMUNITY
Start: 2024-08-16

## 2024-09-09 RX ORDER — TOPIRAMATE 25 MG/1
TABLET ORAL
Qty: 120 TABLET | Refills: 2 | Status: SHIPPED | OUTPATIENT
Start: 2024-09-09 | End: 2024-10-07

## 2024-09-09 ASSESSMENT — ENCOUNTER SYMPTOMS
LOSS OF SENSATION IN FEET: 0
OCCASIONAL FEELINGS OF UNSTEADINESS: 1
DEPRESSION: 0

## 2024-09-09 ASSESSMENT — PATIENT HEALTH QUESTIONNAIRE - PHQ9
SUM OF ALL RESPONSES TO PHQ9 QUESTIONS 1 AND 2: 0
1. LITTLE INTEREST OR PLEASURE IN DOING THINGS: NOT AT ALL
2. FEELING DOWN, DEPRESSED OR HOPELESS: NOT AT ALL

## 2024-09-09 ASSESSMENT — COLUMBIA-SUICIDE SEVERITY RATING SCALE - C-SSRS
5. HAVE YOU STARTED TO WORK OUT OR WORKED OUT THE DETAILS OF HOW TO KILL YOURSELF? DO YOU INTEND TO CARRY OUT THIS PLAN?: NO
2. HAVE YOU ACTUALLY HAD ANY THOUGHTS OF KILLING YOURSELF?: NO
6. HAVE YOU EVER DONE ANYTHING, STARTED TO DO ANYTHING, OR PREPARED TO DO ANYTHING TO END YOUR LIFE?: NO
4. HAVE YOU HAD THESE THOUGHTS AND HAD SOME INTENTION OF ACTING ON THEM?: NO
1. IN THE PAST MONTH, HAVE YOU WISHED YOU WERE DEAD OR WISHED YOU COULD GO TO SLEEP AND NOT WAKE UP?: NO

## 2024-09-09 ASSESSMENT — LIFESTYLE VARIABLES
HOW MANY STANDARD DRINKS CONTAINING ALCOHOL DO YOU HAVE ON A TYPICAL DAY: 1 OR 2
AUDIT-C TOTAL SCORE: 2
HOW OFTEN DO YOU HAVE SIX OR MORE DRINKS ON ONE OCCASION: LESS THAN MONTHLY
HOW OFTEN DO YOU HAVE A DRINK CONTAINING ALCOHOL: MONTHLY OR LESS
SKIP TO QUESTIONS 9-10: 0

## 2024-09-09 NOTE — PATIENT INSTRUCTIONS
It was a pleasure meeting you. You were evaluated for vertigo today. I think it is better to observe Chiari sx for now. We will also start treatment for migraine called topiramate.       -----------------------------------------------------------------------------  Keep a calendar of your migraines.    Wear hair loose.      Exercise regularly, get plenty of fresh air and regular, sleep.    Preventative medication should be taken every day regardless of whether or not, you have a headache.  It is to prevent headaches.    Medication to treat the actual headache should be taken as soon as you realize you have a headache.    Medications such as Maxalt, Imitrex, Relpax, Zomig, can be taken only twice a day and they're only nine pills per month.  If you have frequent headaches may need to save these for the worst headaches.    Do not take pain medication every day.  Your body can become addicted to this medication and may make the headaches worse.  If you are having headaches every day, we need to increase the preventative medication, call me if this is becoming a problem.    Avoid foods that can cause migraines.  These include: Red wine, MSG, nitrates (hot dogs, or lunch meats), caffeine, chocolate, cheese.    Monosodium Glutamate (MSG)  MSG also known as Monosodium glutamate is a common food additive. MSG is used to make food taste better, and can be found in processed products, fast food, and canned soups. MSG can cause headaches when consumed in large amounts.    Fast Foods that Contain MSG  Chick-marietta-A's Chicken [place]   [employer] Chicken's Extra Crispy Chicken Breast    Chips  (Will be listed on ingredient list Monosodium Glutamate)  Doritos  Pringles  Potato Chips    Meats  Hot dogs    Lunch Meats  Beef jerky    Sausages  Smoked meats    Pepperoni  Meat snack sticks    [place]  Patrasmi    Hamburgers  Cold cuts    Salami  Fried Chicken      Chicken Nuggets  Burgers    Sauces  Ketchup    Mayonnai  Barbecue  sauce   Salad dressing  Soy sauce    Mustard    Miscellaneous  Bodybuilding protein powder  Instant Ramen  Spices    Items that cause headaches without MSG  Alcohol (red wine, beer, whiskey, Scotch, and champagne )  Cheese (Aged Cheeses: including Parmesan, Swiss, Brie, Cheddar)      Thank you for choosing the [facility] for your healthcare.    It was a pleasure to see you in clinic today and be able to participate in your care. I hope your questions were fully answered but if there are questions or concerns in the future, please feel to call [phone] or leave [employer] messages. Please allow 24-28 hours to return your call. Please be ware of stroke warning symptoms and call [phone] with any of acute stroke symptoms.

## 2024-09-09 NOTE — PROGRESS NOTES
"   [facility]   Gill Gomes is a 34 y.o. year old female presenting for follow-up from a recent hospitalization .     Referred by: No ref. provider found  PCP: No Assigned PCP Generic Provider, MD    9/3-9/5/2024: presented with sudden onset, with feeling warm and hot, with increasingly dizzy, felt drunk, veering to the right. +Vomiting. Seen by Dr. Piña via teleconsult. MRI was completed which showed a Chiari I malformation which is very mild, and may or may not be the cause of her issues.  No evidence of a demyelinating lesion.  Rec'ed vestibular rehab, ENT follow up, neuro follow up     9/9/2024: presents to neurology clinic. Sx has improved now, no further vertigo. However she notes more chronic sx including issues with clumsiness / depth perception, R sided migraine headaches with photo and phonosensitivity, nausea. Current headache frequency ~2ha/week, ~10 ha / month. Not clearly related to position or movement but pt has been reading about symptoms and has been quite stressed about it. She has longstanding struggles with headaches.     ENT appointment is scheduled 10/8. Will be starting vestibular therapy as well.   Of note on ROS, pt reports orthostatic dizziness. 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     Review of imaging, echocardiogram, labs and other data:   MRI with and without: mild chiari, no demyelinating lesion, no enhancing lesion   Vessel imaging: N/A     Relevant ROS, Problem list, Past Medical/ Surgical/ Family/ Social history- reviewed and pertinent details noted in history.     Objective     Visit Vitals  /74   Pulse 85   Temp 36.7 °C (98 °F) (Temporal)   Resp 18   Ht 1.664 m (5' 5.5\")   Wt 101 kg (222 lb)   BMI 36.38 kg/m²   Smoking Status Never   BSA 2.16 m²       Alert attentive, normal language, orientation, and speech.    Reasonable fund of knowledge and memory, visual fields are " full to confrontation.  Extraocular eye movements are intact without nystagmus.  V1 through V3 is intact to light touch.  Face is symmetric. Casual gait is normal.        CT head wo IV contrast    Result Date: 9/4/2024  No acute intracranial hemorrhage, mass effect or midline shift.  If there is persistent clinical concern for ischemia, recommend further evaluation with MRI.   Fluid-filled left mastoid air cells. Correlate for symptoms of mastoiditis.   MACRO: None.   Signed by: Evan Finkelstein 9/4/2024 3:57 AM Dictation workstation:   [id number]   MR brain w and wo IV contrast    Addendum Date: 9/5/2024    Interpreted By:  Jolynn Stafford, ADDENDUM: Mild asymmetric white matter FLAIR hyperintense signal changes are present in the periventricular white matter of the left corona radiata, near the head and body of the left caudate, without associated postcontrast enhancement or diffusion restriction to suggest active demyelination or infarction. Finding is nonspecific, but does not follow patterns of demyelinating processes such as multiple sclerosis, and may represent early microvascular disease related changes as the periventricular white is supplied by ventriculofugal vessels arising from subependymal arteries, which tend to be more strongly influenced by hemodynamic factors.   Signed by: Jolynn Stafford 9/5/2024 5:47 PM   -------- ORIGINAL REPORT -------- Dictation workstation:   [id number]    Result Date: 9/5/2024  1.  No evidence of infarct, demyelinating lesions, or pathologic intracranial enhancement. 2. Left mastoid effusion with opacification of the left middle ear cavity. Correlate with otitis media and mastoiditis. 3. Findings suggestive of Chiari 1 malformation, with somewhat pointed cerebellar tonsils protruding 6-7 mm below the skull base, with resultant crowding of the foramen magnum and slight effacement of the cervicomedullary junction.     MACRO: None   Signed by: Jolynn  "Gerasymchuk 9/5/2024 12:30 AM Dictation workstation:   [id number]   No results found for this or any previous visit (from the past 4464 hour(s)).  No echocardiogram results found for the past 12 months     No results found for: \"CHOL\", \"TRIG\", \"HDL\", \"CHHDL\", \"LDLF\", \"VLDL\", \"NHDL\"No results found for: \"BNP\", \"HGBA1C\"  Lab Results   Component Value Date    GLUCOSE 129 (H) 09/05/2024     09/05/2024    K 3.9 09/05/2024     (H) 09/05/2024    CO2 23 (L) 09/05/2024    ANIONGAP 9 09/05/2024    BUN 12 09/05/2024    CREATININE 0.70 09/05/2024    CALCIUM 8.9 09/05/2024     Lab Results   Component Value Date    CALCIUM 8.9 09/05/2024     Lab Results   Component Value Date    WBC 16.0 (H) 09/04/2024    HGB 13.6 09/04/2024    HCT 39.3 09/04/2024    MCV 87 09/04/2024     09/04/2024   No results found for: \"INR\"No results found for: \"TSH\"    Assessment/Plan   1. Migraine without aura and without status migrainosus, not intractable    2. Vertigo    3. Chiari I malformation (Multi)        PLAN   Vertigo - resolved. May have been triggered by heat and dehydration  Migraine - R sided HA, with photo / phonosensitivity, nausea. ~10 HA days / month currently, takes Excedrin PRN. Not a good candidate for beta blockers for low normal BP, ongoing sx of orthostatic dizziness. Not a candidate for SSRI due to hx of bipolar. Will do trial of topiramate, start at low dose 25mg at bedtime and increase by 25mg/7 days up to 50mg BID. Follow up in 3 months. Side effects discussed. Also discussed potential effect on mood (possible mood stabilization effect although likely not a strong effect).   Chiari I malformation: discussed typically this causes no symptoms, especially at current degree. Pt likely had this since birth. It is possible it can commonly cause headache. Will treat migraine separately as above.     Total time spent: 60min    Follow up in 3 months.    "

## 2024-09-18 ENCOUNTER — EVALUATION (OUTPATIENT)
Dept: PHYSICAL THERAPY | Facility: CLINIC | Age: 34
End: 2024-09-18
Payer: COMMERCIAL

## 2024-09-18 NOTE — PROGRESS NOTES
Physical Therapy Evaluation    Patient Name: Gill Gomes  MRN: [id number]  Evaluation Date: 2024       Current Problem  Problem List Items Addressed This Visit    None      Subjective   General:       Patient reported hx of injury: recent hospitalization with vertigo ad leukocytosis    Per hospital notes (9/3-):  Hospital Course  This patient presented with acute onset of vertigo which was clearly peripheral clinically.  Symptoms abated with the help of meclizine and Zofran.  She is completely asymptomatic now.  She has longstanding history of ear problems MRI demonstrated chronic mastoid effusion.  I discussed this findings with Dr. Lagunas from ENT who felt that this is unlikely to represent infection in case she has currently no symptoms but he will follow-up with her promptly as an outpatient.  MRI of the brain also question Chiari I malformation and this is currently being reviewed by Dr. Piña from neurology who will discuss findings further with the patient and plan on outpatient follow-up  Surgery:   ***  Red Flags:  ***   Denies focal weakness,, headache, blurred vision, vision loss, numbness, tingling, loss sensation, chest pain, shortness of breath, abdominal pain   Precautions:     Pain:     Home Living:     Pt gets around home safely  Home type: {dkhousetype:12113}  Stairs: {yes,no:97528}  Lives with: {dkliveswith:60009}    Work:  ***time.  Sit stand walk lift    Prior Function Per Pt/Caregiver Report:     Pt goals: ***  Imaging:  ***    OBJECTIVE:  If left blank, assume NT:    TUx STS:     Vestibular/Balance Assessment:    Occulomotor, Vertigo Tests, etc. +/-   Smooth pursuits ***   Saccades ***   VOR ***   Sharp-Isidra Test ***   [place]-Hallpike L ***   [place]-Hallpike R ***   Roll Test R ***   Roll Test L ***   JPSE of C-spine ***   [place] Convergence ***   HIT    Head shake test        Balance Test Scores    MCTSIB ***/4 (***,***,***,***) shoes ***   DGI-4 step ***/12   TUG ***   STS  "in 30\" ***   4 Square Step Test ***           Posture:     Range of Motion:       Hip PROM R L   Flexion *** deg *** deg   Extension *** deg *** deg   Abduction *** deg *** deg   Adduction *** deg *** deg   External Rotation *** deg *** deg   Internal Rotation *** deg *** deg     Knee PROM R L   Flexion *** deg *** deg   Extension *** deg *** deg     Ankle PROM R L   Dorsiflexion *** deg *** deg   Plantarflexion *** deg *** deg   Eversion *** deg *** deg   Inversion *** deg *** deg       Strength:     LE MMT R L   Hip flexion ***/5 ***/5   Hip extension ***/5 ***/5   Hip abduction ***/5 ***/5   Hip adduction ***/5 ***/5   Knee flexion ***/5 ***/5   Knee extension ***/5 ***/5   Ankle DF ***/5 ***/5   Ankle PF ***/5 ***/5   Ankle eversion ***/5 ***/5   Ankle inversion ***/5 ***/5     Flexibility:       Flexibility R L   Hamstring *** ***   Quad *** ***   Hip flexor *** ***     Palpation:     Special Tests:     Gait:     Balance:     Stairs:     Bed Mobility:     Transfers:     Other:       Outcome Measures:  LEFS  WOMAC  OSWESTRY    OP EDUCATION:       Today's Treatment:  {Evaluation treatment options:38396}  HEP to be completed daily, exercises include:  ***    Assessment       pt is s/p *** and needs PT to incr ROM, strength, flexibility, improve balance and decr pain to restore function and ***.    Plan     Skilled PT consisting of:  Aquatics, therapeutic exercise, therapeutic activity, self care, NMR, manual, thermal, electric stimulation, US, light therapy, gait training, transfer training, dry needle.  Mechanical traction PRN and only if OK by PT, canalith repositioning  Rehab Potential: good  Frequency:  2x/wk  Duration:  10 weeks  ***Medicare cert date:      Goals:    STG:   Pt to be I in initial HEP.  Pt to be I in posture correction.    LTG\"s:  Incr MMT of *** by 1 grade for ***.  Improve score on outcome form by 10 points to show incr in function.  Incr ROM of *** by *** degrees for ***.  Decr max pain " level to *** for ***.  Incr flexibility of *** to ***degrees for ***  MCTSIB ***/4 for incr sensory integration.  Improve TUG by *** points to decr fall risk.

## 2024-09-25 ENCOUNTER — EVALUATION (OUTPATIENT)
Dept: PHYSICAL THERAPY | Facility: CLINIC | Age: 34
End: 2024-09-25
Payer: COMMERCIAL

## 2024-09-25 DIAGNOSIS — R51.9 HEAD ACHE: Primary | ICD-10-CM

## 2024-09-25 DIAGNOSIS — R42 VERTIGO: ICD-10-CM

## 2024-09-25 PROCEDURE — 97163 PT EVAL HIGH COMPLEX 45 MIN: CPT | Mod: GP | Performed by: PHYSICAL THERAPIST

## 2024-09-25 NOTE — PROGRESS NOTES
"Physical Therapy Evaluation    Patient Name: Gill Gomes \"Lar[place]\"  MRN: [id number]  Evaluation Date: 9/25/2024  Time Calculation  Start Time: 0832  Stop Time: 0912  Time Calculation (min): 40 min    Current Problem  Problem List Items Addressed This Visit             ICD-10-CM    Vertigo R42     1) EVAL ONLY - [facility] - 35V comb chiro, pt/ot/st / 100% COVERAGE / Availity [id number] / ds 9/17/24. Auth thru [employer] [id number] // [employer] 2) [facility] [facility] - AUTH AFTER 30 VISITS / 100% COVERAGE / Per RTE     Subjective   General:  HA's most of their life, has been in the hospital before with migraine, last was 2 years ago.  Pt states they sleep slumped in bad position prone or side lye, and almost always wake with a stiff neck.  Pt denies regular snoring.  Presently, pt's HA's are better, but gets them posterior head, top of head and to R eye region.  Only 1 HA in 2 weeks since starting topomax, but gets a fleeting shot of pain in this same region with pt described valsalva, over straining with lifting and bathroom use.  Pt describes GERD history.   constipation from new meds-pt notified to tell her doctor.  Fluid in ear, sees ENT soon.  Pt describes being stressed, but mental health better than average right now, denies need for further resources.  Mostly dizzy and HA do not come together.  Pt is trying to stay hydrated, trying to eat frequent small meals as able-work interferes.  Does not do relaxation/breathing techniques    Per neuro notes:  Patient reported hx of injury: ...female presenting for follow-up from a recent hospitalization .      Recent hospitalization 9/3-9/5/2024: presented with sudden onset, with feeling warm and hot, with increasingly dizzy, felt drunk, veering to the right. +Vomiting. Seen by Dr. Piña via teleconsult. MRI was completed which showed a Chiari I malformation which is very mild, and may or may not be the cause of her issues.  No evidence of a demyelinating lesion.  Rec'ed " "vestibular rehab, ENT follow up, neuro follow up     9/9/2024: presents to neurology clinic. Sx has improved now, no further vertigo. However she notes more chronic sx including issues with clumsiness / depth perception, R sided migraine headaches with photo and phonosensitivity, nausea. Current headache frequency ~2ha/week, ~10 ha / month. Not clearly related to position or movement but pt has been reading about symptoms and has been quite stressed about it. She has longstanding struggles with headaches.      ENT appointment is scheduled 10/8. Will be starting vestibular therapy as well.   Of note on ROS, pt reports orthostatic dizziness. 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     Started migraine meds topomax  Not taking meclizine, mostly takes a rest if she feel dizziness coming on and this helps.  Pt does a lot of yard work.    End neuro note.      Red Flags:  severe HA early this month, but was medically worked up for this    Precautions:   Chiari, bipolar hx, sciatica hx, ?orthostatic  Pain:   R neck 2/10 0 HA, dizzy 0, nausea 2/10  Home Living:     Pt gets around home safely    Work:  full time.  stand walk lift up to 50#    Prior Function Per Pt/Caregiver Report:   Pt has had chronic s/s, but recently worst attack that she has had    Pt goals: not be dizzy, help HA, improve balance and hand eye coordination, less dropping objects    OBJECTIVE:  If left blank, assume NT:    Vestibular/Balance Assessment:    Occulomotor, Vertigo Tests, etc. +/-   Smooth pursuits -   Saccades -   VOR -   Sharp-Isidra Test    [place]-Hallpike L -   Chasity-Hallpike R -   Roll Test R    Roll Test L    JPSE of C-spine    [place] Convergence -   HIT    Head shake test        Balance Test Scores    MCTSIB 3/4 (30,30,30,5) shoes off   SLS B > 30\"                       Gait:   Mild scissor at times, mild toe in R > L    Other:   Mild decr coordination with ankles    Outcome " "Measures:  DHI 44    OP EDUCATION:   HEP, POC    Today's Treatment:  No treatment billed today as pt requires prior authorization  HEP to be completed daily, exercises include:  VOR, Diaphragmatic breathing    Assessment       pt is s/p episode of dizziness and other s/s described above, as well as chronic HA, stress, neck pain, decr coordination, dropping objects and needs PT to improve these s/s to restore function.    Plan   Next visit continue testing as needed, orthostatic testing, assess response from HEP.  Due to medical leave starting 11/5/24, anticipate transferring supervision of care to another PT at this facility.    Skilled PT consisting of:  Aquatics, therapeutic exercise, therapeutic activity, self care, NMR, manual, thermal, electric stimulation, US, light therapy, gait training, transfer training, dry needle.  Mechanical traction PRN and only if OK by PT, canalith repositioning  Rehab Potential: good/fair  Frequency:  2x/wk  Duration:  10 weeks    Goals:    STG:   Pt to be I in initial HEP.    LTG\"s:  Pt to state decr dropping objects by 50% for work.  Improve score on outcome form by 10 points to show incr in function.  Decr HA intensity and frequency by 50% for all activity  I diaphragmatic breathing for relaxation and good oxygenation  MCTSIB 4/4 for incr sensory integration.      "

## 2024-10-08 ENCOUNTER — APPOINTMENT (OUTPATIENT)
Dept: OTOLARYNGOLOGY | Facility: CLINIC | Age: 34
End: 2024-10-08
Payer: COMMERCIAL

## 2024-10-08 ENCOUNTER — APPOINTMENT (OUTPATIENT)
Dept: AUDIOLOGY | Facility: CLINIC | Age: 34
End: 2024-10-08
Payer: COMMERCIAL

## 2024-10-08 VITALS — TEMPERATURE: 96.9 F | WEIGHT: 245 LBS | BODY MASS INDEX: 40.82 KG/M2 | HEIGHT: 65 IN

## 2024-10-08 DIAGNOSIS — R42 DIZZINESS: ICD-10-CM

## 2024-10-08 DIAGNOSIS — J31.0 CHRONIC RHINITIS: ICD-10-CM

## 2024-10-08 DIAGNOSIS — H90.A22 SENSORINEURAL HEARING LOSS (SNHL) OF LEFT EAR WITH RESTRICTED HEARING OF RIGHT EAR: Primary | ICD-10-CM

## 2024-10-08 DIAGNOSIS — H93.13 TINNITUS OF BOTH EARS: ICD-10-CM

## 2024-10-08 DIAGNOSIS — H90.A31 MIXED CONDUCTIVE AND SENSORINEURAL HEARING LOSS OF RIGHT EAR WITH RESTRICTED HEARING OF LEFT EAR: ICD-10-CM

## 2024-10-08 DIAGNOSIS — H65.22 SIMPLE CHRONIC SEROUS OTITIS MEDIA OF LEFT EAR: ICD-10-CM

## 2024-10-08 DIAGNOSIS — H69.92 DYSFUNCTION OF LEFT EUSTACHIAN TUBE: Primary | ICD-10-CM

## 2024-10-08 PROCEDURE — 99203 OFFICE O/P NEW LOW 30 MIN: CPT | Performed by: OTOLARYNGOLOGY

## 2024-10-08 PROCEDURE — 3008F BODY MASS INDEX DOCD: CPT | Performed by: OTOLARYNGOLOGY

## 2024-10-08 PROCEDURE — 92550 TYMPANOMETRY & REFLEX THRESH: CPT | Performed by: AUDIOLOGIST

## 2024-10-08 PROCEDURE — 31231 NASAL ENDOSCOPY DX: CPT | Performed by: OTOLARYNGOLOGY

## 2024-10-08 PROCEDURE — 1036F TOBACCO NON-USER: CPT | Performed by: OTOLARYNGOLOGY

## 2024-10-08 PROCEDURE — 92557 COMPREHENSIVE HEARING TEST: CPT | Performed by: AUDIOLOGIST

## 2024-10-08 RX ORDER — FLUTICASONE PROPIONATE 50 MCG
SPRAY, SUSPENSION (ML) NASAL
Qty: 16 G | Refills: 11 | Status: SHIPPED | OUTPATIENT
Start: 2024-10-08

## 2024-10-08 RX ORDER — AZELASTINE 1 MG/ML
SPRAY, METERED NASAL
Qty: 30 ML | Refills: 11 | Status: SHIPPED | OUTPATIENT
Start: 2024-10-08

## 2024-10-08 NOTE — PROGRESS NOTES
AUDIOLOGY ADULT AUDIOMETRIC EVALUATION    Name:  Gill Gomes  :  1990  Age:  34 y.o.  Date of Evaluation:  2024    Reason for visit: Ms. Gomes is seen in the clinic today at the request of otolaryngology for an audiologic evaluation.     HISTORY  Patient complains of dizziness, tinnitus and long term hearing loss.    EVALUATION  See scanned audiogram: “Media” > “Audiology Report”.      RESULTS  Otoscopic Evaluation:  Right Ear: clear ear canal  Left Ear: clear ear canal    Immittance Measures:  Tympanometry:  Right Ear: Type A, normal tympanic membrane mobility with normal middle ear pressure   Left Ear: Type B, reduced tympanic membrane mobility     Acoustic Reflexes:  Ipsilateral Right Ear:  90 95 105  Ipsilateral Left Ear:    NR NRNR  Contralateral Right Ear: did not evaluate  Contralateral Left Ear: did not evaluate    Distortion Product Otoacoustic Emissions (DPOAEs):  Right Ear: REFER: 1-8 K HZ  Left Ear:    REFER 1-8 K HZ    Audiometry:  Test Technique and Reliability: BEHAVIORAL  FAIR.  INSERTS USED    Pure tone air and bone conduction audiometry:  Right Ear:  SEVERE MIXED HEARING LOSS  Left Ear:    MILD MODERATE SNHL    Speech Audiometry (Word Recognition Scores):   Right Ear:  92% GOOD  Left Ear:    92% GOOD    IMPRESSIONS   ASYMMETRIC RIGHT MIXED LOSS AND SNHL LEFT WITH DIZZINESS , TINNITUS.   The presence of acoustic reflexes within normal intensity limits is consistent with normal middle ear and brainstem function, and suggests that auditory sensitivity is not significantly impaired. An elevated or absent acoustic reflex threshold is consistent with a middle ear disorder, hearing loss in the stimulated ear, and/or interruption of neural innervation of the stapedius muscle. Present DPOAEs suggest normal/near normal cochlear outer hair cell function and are consistent with no greater than a mild hearing loss at those frequencies. Absent DPOAEs are consistent with abnormal  cochlear outer hair cell function and some degree of hearing loss at those frequencies.    RECOMMENDATIONS  - Follow up with otolaryngology today as scheduled.Special tests as needed for dizzy, tinnitus   - Audiologic evaluation as needed.  - Annual audiologic evaluation, sooner if an acute change is noted.  - Audiologic evaluation in conjunction with otologic care, if an acute change is noted, and/or annually.  - Consider hearing aids.  CMN GIVEN Contact insurance to determine if there is an applicable benefit and where it can be used. Contact our office to schedule an appointment should she wish to proceed with hearing aids through our clinic.  - Consider hearing aids. Contact insurance to determine if there is an applicable benefit and where it can be used.  - Follow-up with medical care team as planned.    PATIENT EDUCATION  Discussed results, impressions and recommendations with the patient. Questions were addressed and the patient was encouraged to contact our office should concerns arise.    Time for this encounter: 40 MINUTES     Ly Peres  Licensed Audiologist

## 2024-10-08 NOTE — PROGRESS NOTES
"ALISIA  Gill Gomes is a 34 y.o. female admitted to the hospital in early September with acute onset dizziness.  She has a longstanding history of hearing loss.  Had some right hearing loss associated with noise exposure.  She is dealt with left-sided fluid for much of her life.  This is often associated with otalgia.  She has not had ear surgery.  During hospitalization, she had an MRI done which demonstrated a small Chiari I malformation.  She did not have any tumors.  She had an audiogram today with right greater than left hearing loss.  It appeared to be mixed on the right which was surprising given that the left tympanogram was flat and fluid was identified on physical examination of the left.  She continues to have some imbalance but no tae vertigo.  She has a long history of bilateral nasal congestion.  She has not been on any medications for this.      Past Medical History:   Diagnosis Date    Migraines             Medications:     Current Outpatient Medications:     lurasidone (Latuda) 40 mg tablet, Take 1 tablet (40 mg) by mouth once daily at bedtime., Disp: , Rfl:     multivitamin with iron - children's (Cerovite, Jr) chewable tablet, Chew 1 tablet once daily., Disp: , Rfl:     topiramate (Topamax) 25 mg tablet, Take 1 tablet (25 mg) by mouth once daily at bedtime for 7 days, THEN 1 tablet (25 mg) 2 times a day for 7 days, THEN 1 tablet (25 mg) see administration instructions for 7 days, THEN 2 tablets (50 mg) 2 times a day for 7 days. 3rd week-  1tab of 25mg in the morning and 2tabs (50mg) in the evening., Disp: 120 tablet, Rfl: 2     Allergies:  No Known Allergies     Physical Exam:  Last Recorded Vitals  Temperature 36.1 °C (96.9 °F), height 1.651 m (5' 5\"), weight 111 kg (245 lb).  General:     General appearance: Well-developed, well-nourished in no acute distress.       Voice:  normal       Head/face: Normal appearance; nontender to palpation     Facial nerve function: Normal and symmetric " bilaterally.    Oral/oropharynx:     Oral vestibule: Normal labial and gingival mucosa     Tongue/floor of mouth: Normal without lesion     Oropharynx: Clear.  No lesions present of the hard/soft palate, posterior pharynx    Neck:     Neck: Normal appearance, trachea midline     Salivary glands: Normal to palpation bilaterally     Lymph nodes: No cervical lymphadenopathy to palpation     Thyroid: No thyromegaly.  No palpable nodules     Range of motion: Normal    Neurological:     Cortical functions: Alert and oriented x3, appropriate affect       Larynx/hypopharynx:     Laryngeal findings: Mirror exam inadequate or limited secondary to enlarged base of tongue and/or excessive gagging    Ear:     Ear canal: Normal bilaterally     Tympanic membrane: Intact bilaterally.  Right middle ear aerated.  Left with rafaela serous effusion.     Pinna: Normal bilaterally     Hearing:  Gross hearing assessment normal by voice  Chasity-Hallpike testing negative bilaterally.  She felt some imbalance on the left    Nose:     Visualized using: Flexible nasal endoscopy utilized secondary to inadequate anterior rhinoscopy  Nasopharynx: Inadequate mirror examination as above, endoscopy demonstrates normal nasopharynx without obstruction or mass     Nasal dorsum: Nontraumatic midline appearance     Septum: Midline     Inferior turbinates: Congested bilaterally     Mucosa: Bilateral, pink, normal appearing       ASSESSMENT/PLAN:  Recommend trial of nasal sprays.  Regarding her dizziness, this does not seem like primary peripheral vestibulopathy and she seems to be better at this point.  Vestibular rehab recommended and she will continue to follow with neurology regarding the findings on the MRI.  I will see her back in a month or so and if the effusion persists, we may consider myringotomy        Regino Lagunas MD

## 2024-11-05 ENCOUNTER — OFFICE VISIT (OUTPATIENT)
Dept: NEUROLOGY | Facility: CLINIC | Age: 34
End: 2024-11-05
Payer: COMMERCIAL

## 2024-11-05 VITALS
WEIGHT: 225 LBS | TEMPERATURE: 97.3 F | BODY MASS INDEX: 36.16 KG/M2 | HEART RATE: 58 BPM | DIASTOLIC BLOOD PRESSURE: 64 MMHG | HEIGHT: 66 IN | SYSTOLIC BLOOD PRESSURE: 128 MMHG | RESPIRATION RATE: 18 BRPM

## 2024-11-05 DIAGNOSIS — R42 VERTIGO: ICD-10-CM

## 2024-11-05 DIAGNOSIS — G43.E09 CHRONIC MIGRAINE WITH AURA WITHOUT STATUS MIGRAINOSUS, NOT INTRACTABLE: Primary | ICD-10-CM

## 2024-11-05 PROBLEM — M54.30 SCIATICA: Status: ACTIVE | Noted: 2024-04-02

## 2024-11-05 PROCEDURE — 1036F TOBACCO NON-USER: CPT | Performed by: STUDENT IN AN ORGANIZED HEALTH CARE EDUCATION/TRAINING PROGRAM

## 2024-11-05 PROCEDURE — 99215 OFFICE O/P EST HI 40 MIN: CPT | Performed by: STUDENT IN AN ORGANIZED HEALTH CARE EDUCATION/TRAINING PROGRAM

## 2024-11-05 PROCEDURE — 3008F BODY MASS INDEX DOCD: CPT | Performed by: STUDENT IN AN ORGANIZED HEALTH CARE EDUCATION/TRAINING PROGRAM

## 2024-11-05 RX ORDER — RIZATRIPTAN BENZOATE 10 MG/1
10 TABLET ORAL ONCE AS NEEDED
Qty: 9 TABLET | Refills: 0 | Status: SHIPPED | OUTPATIENT
Start: 2024-11-05 | End: 2024-12-05

## 2024-11-05 RX ORDER — ACETAMINOPHEN 325 MG/1
650 TABLET ORAL EVERY 6 HOURS PRN
COMMUNITY
Start: 2024-04-03

## 2024-11-05 RX ORDER — CYCLOBENZAPRINE HCL 10 MG
TABLET ORAL
COMMUNITY
Start: 2024-04-03 | End: 2025-04-03

## 2024-11-05 RX ORDER — NAPROXEN 500 MG/1
TABLET ORAL
COMMUNITY
Start: 2024-04-03 | End: 2025-04-03

## 2024-11-05 RX ORDER — ERENUMAB-AOOE 70 MG/ML
70 INJECTION SUBCUTANEOUS
Qty: 1 ML | Refills: 11 | Status: SHIPPED | OUTPATIENT
Start: 2024-11-05 | End: 2025-11-05

## 2024-11-05 ASSESSMENT — ENCOUNTER SYMPTOMS
LOSS OF SENSATION IN FEET: 0
DEPRESSION: 0
OCCASIONAL FEELINGS OF UNSTEADINESS: 0

## 2024-11-05 ASSESSMENT — PATIENT HEALTH QUESTIONNAIRE - PHQ9
1. LITTLE INTEREST OR PLEASURE IN DOING THINGS: NOT AT ALL
SUM OF ALL RESPONSES TO PHQ9 QUESTIONS 1 AND 2: 0
2. FEELING DOWN, DEPRESSED OR HOPELESS: NOT AT ALL

## 2024-11-05 ASSESSMENT — PAIN SCALES - GENERAL: PAINLEVEL_OUTOF10: 0-NO PAIN

## 2024-11-05 NOTE — PATIENT INSTRUCTIONS
Topiramate -- please take 25mg two times a day for 3 days then 25mg at night time for 3 days then STOP.     We will try to get newer generation (CGRP inhibitors) approved for your headaches.    Meanwhile, we will try Maxalt for rescue medication. Please take it at the time of the start of the headaches.    We will obtain EEG study to evaluate for possibility of seizures given stereotyped events.          Thank you for choosing the [facility] [place] for your healthcare.    It was a pleasure to see you in clinic today and be able to participate in your care. I hope your questions were fully answered but if there are questions or concerns in the future, please feel to call [phone] or leave Symphony messages. Please allow 24-28 hours to return your call. Please be ware of stroke warning symptoms and call 911 with any of acute stroke symptoms.        Important Phone Numbers for scheduling tests at [facility]  EEG, EMG, autonomic  testing all locations- [phone] (listen to options)          Keep a calendar of your migraines.    Wear hair loose.      Exercise regularly, get plenty of fresh air and regular, sleep.    Preventative medication should be taken every day regardless of whether or not, you have a headache.  It is to prevent headaches.    Medication to treat the actual headache should be taken as soon as you realize you have a headache.    Medications such as Maxalt, Imitrex, Relpax, Zomig, can be taken only twice a day and they're only nine pills per month.  If you have frequent headaches may need to save these for the worst headaches.    Do not take pain medication every day.  Your body can become addicted to this medication and may make the headaches worse.  If you are having headaches every day, we need to increase the preventative medication, call me if this is becoming a problem.    Avoid foods that can cause migraines.  These include: Red wine, MSG, nitrates  (hot dogs, or lunch meats), caffeine, chocolate, cheese.    Monosodium Glutamate (MSG)  MSG also known as Monosodium glutamate is a common food additive. MSG is used to make food taste better, and can be found in processed products, fast food, and canned soups. MSG can cause headaches when consumed in large amounts.    Fast Foods that Contain MSG  Chick-marietta-A's Chicken [place]   K[place] Fried Chicken's Extra Crispy Chicken Breast    Chips  (Will be listed on ingredient list Monosodium Glutamate)  Doritos  Pringles  Potato Chips    Meats  Hot dogs    Lunch Meats  Beef jerky    Sausages  Smoked meats    Pepperoni  Meat snack sticks    [place]  Patrasmi    Hamburgers  Cold cuts    Salami  Fried Chicken      Chicken Nuggets  Burgers    Sauces  Ketchup    Mayonnaise  Barbecue sauce   Salad dressing  Soy sauce    Mustard    Miscellaneous  Bodybuilding protein powder  Instant Ramen  Spices    Items that cause headaches without MSG  Alcohol (red wine, beer, whiskey, Scotch, and champagne )  Cheese (Aged Cheeses: including Parmesan, Swiss, Brie, Cheddar)

## 2024-11-05 NOTE — PROGRESS NOTES
"   Neurological [facility]   Gill Gomes is a 34 y.o. year old adult presenting for follow up of her headaches.     Referred by: El Quiroz MD  PCP: No Assigned PCP Generic Provider, MD    Sx initially started with dizziness vomiting and veering to there right on 9/3, was admitted in [facility], evaluated by Dr. Piña. MRI was completed which showed a Chiari I malformation which is very mild, and may or may not be the cause of her issues.  No evidence of a demyelinating lesion.  Rec'ed vestibular rehab, ENT follow up, neuro follow up.     Last visit was 9.9.2024, at that time vertigo improved but reported migrane headaches, started on topiramate.     11/5/2024: today pt follows up for migraine started on topiramate, states headaches have worsened on topiramate. Frequency is increased to up to 3-4x/week, 15 ha days / month. Episodes are now more stereotyped, with sensation of being overheated -- followed by vertigo and vcery intense headache, R sided and throbbing, with photo/phonophbia, balance issues, and nausea  vomiting, lasts 3-4 hours until sx starts to subside but pt still have to lay in bed for the rest of the day essentially due to  lingering sx. As the sx clears up she gets freezing cold as well. It has been interfering with her work as  and she is concerned she had to take off so many days from work that she may lose her job.       Review of imaging, echocardiogram, labs and other data:   MRI with and without: mild chiari, no demyelinating lesion, no enhancing lesion   Vessel imaging: N/A     Relevant ROS, Problem list, Past Medical/ Surgical/ Family/ Social history- reviewed and pertinent details noted in history.     Objective     Visit Vitals  /64 (BP Location: Right arm, Patient Position: Sitting, BP Cuff Size: Adult)   Pulse 58   Temp 36.3 °C (97.3 °F) (Temporal)   Resp 18   Ht 1.676 m (5' 6\")   Wt 102 kg (225 lb)   BMI 36.32 kg/m²   Smoking Status Never   BSA 2.18 m² "       Alert attentive, normal language, orientation, and speech.    Reasonable fund of knowledge and memory, visual fields are full to confrontation.  Extraocular eye movements are intact without nystagmus.  V1 through V3 is intact to light touch.  Face is symmetric. Casual gait is normal.        CT head wo IV contrast    Result Date: 9/4/2024  No acute intracranial hemorrhage, mass effect or midline shift.  If there is persistent clinical concern for ischemia, recommend further evaluation with MRI.   Fluid-filled left mastoid air cells. Correlate for symptoms of mastoiditis.   MACRO: None.   Signed by: Evan Finkelstein 9/4/2024 3:57 AM Dictation workstation:   [id number]   MR brain w and wo IV contrast    Addendum Date: 9/5/2024    Interpreted By:  Jolynn Stafford, ADDENDUM: Mild asymmetric white matter FLAIR hyperintense signal changes are present in the periventricular white matter of the left corona radiata, near the head and body of the left caudate, without associated postcontrast enhancement or diffusion restriction to suggest active demyelination or infarction. Finding is nonspecific, but does not follow patterns of demyelinating processes such as multiple sclerosis, and may represent early microvascular disease related changes as the periventricular white is supplied by ventriculofugal vessels arising from subependymal arteries, which tend to be more strongly influenced by hemodynamic factors.   Signed by: Jolynn Stafford 9/5/2024 5:47 PM   -------- ORIGINAL REPORT -------- Dictation workstation:   [id number]    Result Date: 9/5/2024  1.  No evidence of infarct, demyelinating lesions, or pathologic intracranial enhancement. 2. Left mastoid effusion with opacification of the left middle ear cavity. Correlate with otitis media and mastoiditis. 3. Findings suggestive of Chiari 1 malformation, with somewhat pointed cerebellar tonsils protruding 6-7 mm below the skull base, with resultant crowding  "of the foramen magnum and slight effacement of the cervicomedullary junction.     MACRO: None   Signed by: Jolynn Stafford 9/5/2024 12:30 AM Dictation workstation:   [id number]   No results found for this or any previous visit (from the past 4464 hours).  No echocardiogram results found for the past 12 months     No results found for: \"CHOL\", \"TRIG\", \"HDL\", \"CHHDL\", \"LDLF\", \"VLDL\", \"NHDL\"No results found for: \"BNP\", \"HGBA1C\"  Lab Results   Component Value Date    GLUCOSE 129 (H) 09/05/2024     09/05/2024    K 3.9 09/05/2024     (H) 09/05/2024    CO2 23 (L) 09/05/2024    ANIONGAP 9 09/05/2024    BUN 12 09/05/2024    CREATININE 0.70 09/05/2024    CALCIUM 8.9 09/05/2024     Lab Results   Component Value Date    CALCIUM 8.9 09/05/2024     Lab Results   Component Value Date    WBC 16.0 (H) 09/04/2024    HGB 13.6 09/04/2024    HCT 39.3 09/04/2024    MCV 87 09/04/2024     09/04/2024   No results found for: \"INR\"No results found for: \"TSH\"    Assessment/Plan   1. Chronic migraine with aura without status migrainosus, not intractable    2. Vertigo          PLAN   Episodes of overheating, vertigo, HA -- atypical course of migraine aura. Given stereotyped course of these events, would do EEG to evaluate for any seizures.   Chronic Migraine with vestibular aura - R sided HA, with photo / phonosensitivity, nausea. Now~15 HA days / month currently and it is affecting her job severely. She takes Excedrin PRN. Not a candidate for beta blockers for low normal BP, ongoing sx of orthostatic dizziness. Not a candidate for SSRI due to hx of bipolar and risking precipitation of lazaro. Has failed topiramate due to ineffectiveness (made sx worse) and side effects (mood swing, bad taste). Will prescribe Maxalt for abortive for trial. Will see if pt can get a CGRP inhibitor for migraine prevention approved.   Chiari I malformation: discussed typically this causes no symptoms, especially at current degree. Pt likely " had this since birth. It is possible it can commonly cause headache. Will treat migraine separately as above.     Total time spent: 54min    Follow up in 1 month.

## 2024-11-07 ENCOUNTER — SPECIALTY PHARMACY (OUTPATIENT)
Dept: PHARMACY | Facility: CLINIC | Age: 34
End: 2024-11-07

## 2024-11-11 ENCOUNTER — SPECIALTY PHARMACY (OUTPATIENT)
Dept: PHARMACY | Facility: CLINIC | Age: 34
End: 2024-11-11

## 2024-11-11 PROCEDURE — RXMED WILLOW AMBULATORY MEDICATION CHARGE

## 2024-11-12 ENCOUNTER — PHARMACY VISIT (OUTPATIENT)
Dept: PHARMACY | Facility: CLINIC | Age: 34
End: 2024-11-12
Payer: MEDICAID

## 2024-11-14 ENCOUNTER — SPECIALTY PHARMACY (OUTPATIENT)
Dept: PHARMACY | Facility: CLINIC | Age: 34
End: 2024-11-14

## 2024-11-14 NOTE — PROGRESS NOTES
"[facility] Clinical Note  Initial Patient Education     Introduction  Gill Gomes \"Laura\" is a 34 y.o. adult who is on the specialty pharmacy service for management of: Migraine Core.    Gill Gomes \"Laura\" is initiating the following therapy: Aimovig 70mg under skin once every 28 days.    Medication receipt date: 11/13/24  Duration of therapy: Until drug toxicity or progression    The most recent encounter visit with the referring prescriber Dr. El Quiroz on 11/5/24 was reviewed.  Pharmacy will continue to collaborate in the care of this patient with the referring prescriber.    Clinical Background  An initial assessment was conducted prior to first fill of the medication to determine the appropriateness of therapy given the patient's diagnosis, medication list, comorbidities, allergies, medical history, patient's ability to self administer medication, and therapeutic goals based on possible outcomes of therapy. Refer to initial assessment task completed on 11/11/24.    Labs for clinical appropriateness that were reviewed include:   Neurology - Migraine - There are no routine laboratory monitoring parameters for this medication    Education/Discussion  Gill was contacted on 11/14/2024 at 12:46 PM for a pharmacy visit with encounter number [id number] from:   [facility]  [address] [place] [id number]  Dept: [phone]  Dept Fax: [phone]  Gill consented to a/an Telephone visit, which was performed.    Medication Start Date (planned or actual): 11/14/24  Education was conducted prior to start of therapy? Yes    Education discussed includes the following:  Patient Education  Counseled the Patient on the Following : Theraputic rationale and expected outcomes, Expected duration of therapy, Doses and administration, Possible drug interactions, Contraindications and precautions, Safe handling, storage, and disposal, Possible " "side effects and management, Adherence and missed doses, Lab monitoring and follow-up, Associated vaccinations  Learner: Patient  Education Method: Explanation  Education Response: Verbalizes understanding  Additional details of the medication specific counseling are found within the linked patient education flowsheet.     The follow up timeline was discussed. Every person responds to and reacts to therapy differently. Patient should be assessed for efficacy and tolerability in approximately: 3 months    Provided education on goals and possible outcomes of therapy:  Adherence with therapy  Timely completion of appropriate labs  Timely and appropriate follow up with provider  Identify and address medication interactions with presciption medications, OTC medications and supplements  Optimize or maintain quality of life  Neurology- Migraine: 50% reduction in migraine days each month from baseline  Decreased use of \"prn\" agents to treat migraine headaches  Improvement in MIDAS score from baseline    The importance of adherence was discussed and they were advised to take the medication as prescribed by their provider.     Impression/Plan  Review and Assessment   Reviewed During This Encounter: Allergies, Medications, Problem list  Medications Assessed for Appropriate Use, Dose, Route, Frequency, and Duration: Yes  Medication Reconciliation Completed: Yes  Drug Interactions Evaluated: Yes  Clinically Relevant Drug Interactions Identified: No    This patient has not been identified as high risk due to Lack of high risk qualifiers.  The following action was taken: N/A.    QOL/Patient Satisfaction  Rate your quality of life on scale of 1-10: 6  Rate your satisfaction with  Specialty Pharmacy on scale of 1-10: 10 - Completely satisfied    Provided contact information ([phone]) for [facility] and reviewed dispensing process, refill timeline and patient management follow up. Advised to " contact the pharmacy if there are any adverse effects and/or changes to medication list, including prescriptions, OTC medications, herbal products, or supplements. Confirmed understanding of education conducted during assessment. All questions and concerns were addressed and patient was encouraged to reach out for additional questions or concerns.    LEX CHRISTOPHER, JoyceD

## 2024-11-20 DIAGNOSIS — G43.E09 CHRONIC MIGRAINE WITH AURA WITHOUT STATUS MIGRAINOSUS, NOT INTRACTABLE: ICD-10-CM

## 2024-11-20 RX ORDER — RIZATRIPTAN BENZOATE 10 MG/1
10 TABLET ORAL ONCE AS NEEDED
Qty: 9 TABLET | Refills: 11 | Status: SHIPPED | OUTPATIENT
Start: 2024-11-20 | End: 2025-11-20

## 2024-12-04 ENCOUNTER — APPOINTMENT (OUTPATIENT)
Dept: NEUROLOGY | Facility: CLINIC | Age: 34
End: 2024-12-04
Payer: COMMERCIAL

## 2024-12-06 ENCOUNTER — DOCUMENTATION (OUTPATIENT)
Dept: PHYSICAL THERAPY | Facility: CLINIC | Age: 34
End: 2024-12-06
Payer: COMMERCIAL

## 2024-12-06 NOTE — PROGRESS NOTES
"Physical Therapy    Discharge Summary    Name: Gill Gomes \"Roxie"  MRN: [id number]  : 1990  Date: 24    Discharge Summary: PT    Discharge Information: Date of discharge 2024    Therapy Summary: Patient came in for 1 physical therapy evaluation    Discharge Status: Unknown    Rehab Discharge Reason: Failed to schedule and/or keep follow-up appointment(s)  "

## 2024-12-07 PROCEDURE — RXMED WILLOW AMBULATORY MEDICATION CHARGE

## 2024-12-11 ENCOUNTER — SPECIALTY PHARMACY (OUTPATIENT)
Dept: PHARMACY | Facility: CLINIC | Age: 34
End: 2024-12-11

## 2024-12-12 ENCOUNTER — PHARMACY VISIT (OUTPATIENT)
Dept: PHARMACY | Facility: CLINIC | Age: 34
End: 2024-12-12
Payer: MEDICAID

## 2024-12-16 ENCOUNTER — APPOINTMENT (OUTPATIENT)
Dept: NEUROLOGY | Facility: CLINIC | Age: 34
End: 2024-12-16
Payer: COMMERCIAL

## 2025-01-13 ENCOUNTER — SPECIALTY PHARMACY (OUTPATIENT)
Dept: PHARMACY | Facility: CLINIC | Age: 35
End: 2025-01-13

## 2025-01-13 PROCEDURE — RXMED WILLOW AMBULATORY MEDICATION CHARGE

## 2025-01-14 ENCOUNTER — PHARMACY VISIT (OUTPATIENT)
Dept: PHARMACY | Facility: CLINIC | Age: 35
End: 2025-01-14
Payer: MEDICAID

## 2025-02-07 PROCEDURE — RXMED WILLOW AMBULATORY MEDICATION CHARGE

## 2025-02-13 ENCOUNTER — SPECIALTY PHARMACY (OUTPATIENT)
Dept: PHARMACY | Facility: CLINIC | Age: 35
End: 2025-02-13

## 2025-02-17 ENCOUNTER — PHARMACY VISIT (OUTPATIENT)
Dept: PHARMACY | Facility: CLINIC | Age: 35
End: 2025-02-17
Payer: MEDICAID

## 2025-08-19 ENCOUNTER — SPECIALTY PHARMACY (OUTPATIENT)
Dept: PHARMACY | Facility: CLINIC | Age: 35
End: 2025-08-19